# Patient Record
Sex: FEMALE | Race: WHITE | NOT HISPANIC OR LATINO | URBAN - METROPOLITAN AREA
[De-identification: names, ages, dates, MRNs, and addresses within clinical notes are randomized per-mention and may not be internally consistent; named-entity substitution may affect disease eponyms.]

---

## 2017-02-21 ENCOUNTER — OUTPATIENT (OUTPATIENT)
Dept: OUTPATIENT SERVICES | Facility: HOSPITAL | Age: 47
LOS: 1 days | Discharge: HOME | End: 2017-02-21

## 2017-06-27 DIAGNOSIS — R53.81 OTHER MALAISE: ICD-10-CM

## 2017-06-27 DIAGNOSIS — E11.9 TYPE 2 DIABETES MELLITUS WITHOUT COMPLICATIONS: ICD-10-CM

## 2017-06-27 DIAGNOSIS — M25.50 PAIN IN UNSPECIFIED JOINT: ICD-10-CM

## 2017-06-27 DIAGNOSIS — R06.02 SHORTNESS OF BREATH: ICD-10-CM

## 2017-06-27 DIAGNOSIS — Z00.00 ENCOUNTER FOR GENERAL ADULT MEDICAL EXAMINATION WITHOUT ABNORMAL FINDINGS: ICD-10-CM

## 2017-06-27 DIAGNOSIS — E78.00 PURE HYPERCHOLESTEROLEMIA, UNSPECIFIED: ICD-10-CM

## 2017-06-27 DIAGNOSIS — R94.6 ABNORMAL RESULTS OF THYROID FUNCTION STUDIES: ICD-10-CM

## 2017-06-27 DIAGNOSIS — E55.9 VITAMIN D DEFICIENCY, UNSPECIFIED: ICD-10-CM

## 2017-06-27 DIAGNOSIS — R53.83 OTHER FATIGUE: ICD-10-CM

## 2017-06-27 DIAGNOSIS — E03.9 HYPOTHYROIDISM, UNSPECIFIED: ICD-10-CM

## 2017-06-27 DIAGNOSIS — N39.0 URINARY TRACT INFECTION, SITE NOT SPECIFIED: ICD-10-CM

## 2017-06-27 DIAGNOSIS — R74.8 ABNORMAL LEVELS OF OTHER SERUM ENZYMES: ICD-10-CM

## 2018-02-03 ENCOUNTER — OUTPATIENT (OUTPATIENT)
Dept: OUTPATIENT SERVICES | Facility: HOSPITAL | Age: 48
LOS: 1 days | Discharge: HOME | End: 2018-02-03

## 2018-02-03 DIAGNOSIS — Z12.31 ENCOUNTER FOR SCREENING MAMMOGRAM FOR MALIGNANT NEOPLASM OF BREAST: ICD-10-CM

## 2018-08-08 ENCOUNTER — TRANSCRIPTION ENCOUNTER (OUTPATIENT)
Age: 48
End: 2018-08-08

## 2018-09-20 ENCOUNTER — TRANSCRIPTION ENCOUNTER (OUTPATIENT)
Age: 48
End: 2018-09-20

## 2018-10-16 ENCOUNTER — TRANSCRIPTION ENCOUNTER (OUTPATIENT)
Age: 48
End: 2018-10-16

## 2018-10-17 ENCOUNTER — TRANSCRIPTION ENCOUNTER (OUTPATIENT)
Age: 48
End: 2018-10-17

## 2019-02-09 ENCOUNTER — OUTPATIENT (OUTPATIENT)
Dept: OUTPATIENT SERVICES | Facility: HOSPITAL | Age: 49
LOS: 1 days | Discharge: HOME | End: 2019-02-09

## 2019-02-09 DIAGNOSIS — Z12.31 ENCOUNTER FOR SCREENING MAMMOGRAM FOR MALIGNANT NEOPLASM OF BREAST: ICD-10-CM

## 2019-06-25 ENCOUNTER — TRANSCRIPTION ENCOUNTER (OUTPATIENT)
Age: 49
End: 2019-06-25

## 2019-07-20 ENCOUNTER — TRANSCRIPTION ENCOUNTER (OUTPATIENT)
Age: 49
End: 2019-07-20

## 2019-07-31 ENCOUNTER — OUTPATIENT (OUTPATIENT)
Dept: OUTPATIENT SERVICES | Facility: HOSPITAL | Age: 49
LOS: 1 days | Discharge: HOME | End: 2019-07-31
Payer: COMMERCIAL

## 2019-07-31 DIAGNOSIS — R92.2 INCONCLUSIVE MAMMOGRAM: ICD-10-CM

## 2019-07-31 PROCEDURE — 76641 ULTRASOUND BREAST COMPLETE: CPT | Mod: 26,50

## 2019-10-18 ENCOUNTER — TRANSCRIPTION ENCOUNTER (OUTPATIENT)
Age: 49
End: 2019-10-18

## 2020-02-15 ENCOUNTER — OUTPATIENT (OUTPATIENT)
Dept: OUTPATIENT SERVICES | Facility: HOSPITAL | Age: 50
LOS: 1 days | Discharge: HOME | End: 2020-02-15
Payer: COMMERCIAL

## 2020-02-15 DIAGNOSIS — Z12.31 ENCOUNTER FOR SCREENING MAMMOGRAM FOR MALIGNANT NEOPLASM OF BREAST: ICD-10-CM

## 2020-02-15 PROCEDURE — 77067 SCR MAMMO BI INCL CAD: CPT | Mod: 26

## 2020-02-15 PROCEDURE — 77063 BREAST TOMOSYNTHESIS BI: CPT | Mod: 26

## 2020-05-29 PROBLEM — Z00.00 ENCOUNTER FOR PREVENTIVE HEALTH EXAMINATION: Status: ACTIVE | Noted: 2020-05-29

## 2020-06-01 ENCOUNTER — APPOINTMENT (OUTPATIENT)
Dept: OTOLARYNGOLOGY | Facility: HOSPITAL | Age: 50
End: 2020-06-01
Payer: COMMERCIAL

## 2020-06-01 VITALS
WEIGHT: 150 LBS | SYSTOLIC BLOOD PRESSURE: 114 MMHG | DIASTOLIC BLOOD PRESSURE: 78 MMHG | HEIGHT: 64 IN | BODY MASS INDEX: 25.61 KG/M2

## 2020-06-01 DIAGNOSIS — Z78.9 OTHER SPECIFIED HEALTH STATUS: ICD-10-CM

## 2020-06-01 PROCEDURE — 99204 OFFICE O/P NEW MOD 45 MIN: CPT

## 2020-06-01 RX ORDER — DICYCLOMINE HYDROCHLORIDE 20.6 MG/1
TABLET ORAL
Refills: 0 | Status: ACTIVE | COMMUNITY

## 2020-06-01 NOTE — HISTORY OF PRESENT ILLNESS
[de-identified] : Patient presents today with c/o a ranula. Patient states it has been present for a few months. Patient notes it comes and goes. Patient has had a few procedures on it but it keeps getting infected and painful. She has previously seen Dr. Reyes for treatment but continues to have reoccurrence. \par She also has a h/o narrow ear canals and cerumen impaction. Her ears feel clogged. \par Pt has thyroid nodules and has been followed and has stable small nodules that have benign in the past.

## 2020-06-01 NOTE — PHYSICAL EXAM
[Normal] : lingual tonsils are normal [Midline] : trachea located in midline position [de-identified] : bilateral cerumen impaction, narrow eacs [de-identified] : left floor of mouth

## 2020-06-30 ENCOUNTER — APPOINTMENT (OUTPATIENT)
Dept: OTOLARYNGOLOGY | Facility: CLINIC | Age: 50
End: 2020-06-30
Payer: COMMERCIAL

## 2020-06-30 ENCOUNTER — TRANSCRIPTION ENCOUNTER (OUTPATIENT)
Age: 50
End: 2020-06-30

## 2020-06-30 PROCEDURE — 99214 OFFICE O/P EST MOD 30 MIN: CPT

## 2020-06-30 NOTE — HISTORY OF PRESENT ILLNESS
[de-identified] : Patient presents today with c/o a ranula. Patient states it has been present for a few months. Patient notes it comes and goes. Patient has had a few procedures on it but it keeps getting infected and painful. She has previously seen Dr. Reyes for treatment but continues to have reoccurrence. \par She also has a h/o narrow ear canals and cerumen impaction. Her ears feel clogged. \par Pt has thyroid nodules and has been followed and has stable small nodules that have benign in the past.  [FreeTextEntry1] : \par 6/30/2020: Patient following up today on ranula and multiple thyroid nodules. Patient sent for MRI; here to discuss results. Patient denies any new changes. \par \par Regarding ranula she noted a cyst in the mouth, underwent procedure by Dr Reyes, drainage, though it has continued to recur. She was scheduled for surgery by Dr Feng at [facility] prior to COVID, was cancelled due to COVID. She notes continued swelling of the cyst, 2 days ago most recently, she then can feel it bursting. Denies pain currently. \par \par Regarding thyroid nodules, she has had them biopsied, most recently 2 years ago. was told they were negative for malignancy, most recent us 6 months, no change per patient, this all being monitored by Dr Reyes.

## 2020-06-30 NOTE — PHYSICAL EXAM
[Midline] : trachea located in midline position [Normal] : no rashes [de-identified] : mild fullness of the floor of mouth, midline and left > right

## 2020-06-30 NOTE — ASSESSMENT
[FreeTextEntry1] : - thyroid nodules stable and benign per patient, being followed by Dr Reyes for these\par - regarding ranula, not appreciable on MRI, images reviewed by me, notable dental artifact\par - warren request outside CT neck for pre-op review\par - discussed surgical excision of ranula, transoral, in management to prevent recurrence of cystic enlargement and bursting, which is bothering patient. Discussed risks, benefits, and alternatives in extensive detail. Discussed transoral excision with possible transcervical approach though suspect transoral approach will be adequate. Will review CT neck. Risks include scar, infection, recurrence, need for future procedures. Patient asked questions and these were answered to her apparent satisfaction. Patient gave informed written consent.\par

## 2020-06-30 NOTE — CONSULT LETTER
[Dear  ___] : Dear  [unfilled], [Please see my note below.] : Please see my note below. [Consult Letter:] : I had the pleasure of evaluating your patient, [unfilled]. [Sincerely,] : Sincerely, [Consult Closing:] : Thank you very much for allowing me to participate in the care of this patient.  If you have any questions, please do not hesitate to contact me. [FreeTextEntry2] : Kyle Kyle MD [FreeTextEntry3] : Tequila Liz MD\par Otolaryngology - Head & Neck Surgery\par

## 2020-06-30 NOTE — DATA REVIEWED
[de-identified] : relevant images and reports personally reviewed by me:\par MRI 6/10/20\par no definite evidence of fluid collection/ranula ID'ed in left SMG region or anterior FOM corresponding to fusiform focus of low density on prior CT neck 1/27/20, though area obscured by dental amalgam artifact

## 2020-06-30 NOTE — REASON FOR VISIT
[Subsequent Evaluation] : a subsequent evaluation for [FreeTextEntry2] : ranula of floor of mouth, thyroid nodules

## 2020-07-24 ENCOUNTER — OUTPATIENT (OUTPATIENT)
Dept: OUTPATIENT SERVICES | Facility: HOSPITAL | Age: 50
LOS: 1 days | Discharge: HOME | End: 2020-07-24
Payer: COMMERCIAL

## 2020-07-24 VITALS
WEIGHT: 156.53 LBS | HEIGHT: 64 IN | RESPIRATION RATE: 16 BRPM | SYSTOLIC BLOOD PRESSURE: 108 MMHG | OXYGEN SATURATION: 99 % | TEMPERATURE: 98 F | HEART RATE: 76 BPM | DIASTOLIC BLOOD PRESSURE: 66 MMHG

## 2020-07-24 DIAGNOSIS — Z98.82 BREAST IMPLANT STATUS: Chronic | ICD-10-CM

## 2020-07-24 DIAGNOSIS — Z01.818 ENCOUNTER FOR OTHER PREPROCEDURAL EXAMINATION: ICD-10-CM

## 2020-07-24 DIAGNOSIS — E04.2 NONTOXIC MULTINODULAR GOITER: ICD-10-CM

## 2020-07-24 DIAGNOSIS — Z98.890 OTHER SPECIFIED POSTPROCEDURAL STATES: Chronic | ICD-10-CM

## 2020-07-24 LAB
ALBUMIN SERPL ELPH-MCNC: 4.2 G/DL — SIGNIFICANT CHANGE UP (ref 3.5–5.2)
ALP SERPL-CCNC: 43 U/L — SIGNIFICANT CHANGE UP (ref 30–115)
ALT FLD-CCNC: 21 U/L — SIGNIFICANT CHANGE UP (ref 0–41)
ANION GAP SERPL CALC-SCNC: 10 MMOL/L — SIGNIFICANT CHANGE UP (ref 7–14)
APTT BLD: 33.7 SEC — SIGNIFICANT CHANGE UP (ref 27–39.2)
AST SERPL-CCNC: 21 U/L — SIGNIFICANT CHANGE UP (ref 0–41)
BASOPHILS # BLD AUTO: 0.02 K/UL — SIGNIFICANT CHANGE UP (ref 0–0.2)
BASOPHILS NFR BLD AUTO: 0.5 % — SIGNIFICANT CHANGE UP (ref 0–1)
BILIRUB SERPL-MCNC: 0.3 MG/DL — SIGNIFICANT CHANGE UP (ref 0.2–1.2)
BUN SERPL-MCNC: 16 MG/DL — SIGNIFICANT CHANGE UP (ref 10–20)
CALCIUM SERPL-MCNC: 9.7 MG/DL — SIGNIFICANT CHANGE UP (ref 8.5–10.1)
CHLORIDE SERPL-SCNC: 102 MMOL/L — SIGNIFICANT CHANGE UP (ref 98–110)
CO2 SERPL-SCNC: 27 MMOL/L — SIGNIFICANT CHANGE UP (ref 17–32)
CREAT SERPL-MCNC: 0.7 MG/DL — SIGNIFICANT CHANGE UP (ref 0.7–1.5)
EOSINOPHIL # BLD AUTO: 0.07 K/UL — SIGNIFICANT CHANGE UP (ref 0–0.7)
EOSINOPHIL NFR BLD AUTO: 1.8 % — SIGNIFICANT CHANGE UP (ref 0–8)
GLUCOSE SERPL-MCNC: 78 MG/DL — SIGNIFICANT CHANGE UP (ref 70–99)
HCT VFR BLD CALC: 40.1 % — SIGNIFICANT CHANGE UP (ref 37–47)
HGB BLD-MCNC: 12.9 G/DL — SIGNIFICANT CHANGE UP (ref 12–16)
IMM GRANULOCYTES NFR BLD AUTO: 0.3 % — SIGNIFICANT CHANGE UP (ref 0.1–0.3)
INR BLD: 1.1 RATIO — SIGNIFICANT CHANGE UP (ref 0.65–1.3)
LYMPHOCYTES # BLD AUTO: 1.47 K/UL — SIGNIFICANT CHANGE UP (ref 1.2–3.4)
LYMPHOCYTES # BLD AUTO: 38 % — SIGNIFICANT CHANGE UP (ref 20.5–51.1)
MCHC RBC-ENTMCNC: 32 PG — HIGH (ref 27–31)
MCHC RBC-ENTMCNC: 32.2 G/DL — SIGNIFICANT CHANGE UP (ref 32–37)
MCV RBC AUTO: 99.5 FL — HIGH (ref 81–99)
MONOCYTES # BLD AUTO: 0.48 K/UL — SIGNIFICANT CHANGE UP (ref 0.1–0.6)
MONOCYTES NFR BLD AUTO: 12.4 % — HIGH (ref 1.7–9.3)
NEUTROPHILS # BLD AUTO: 1.82 K/UL — SIGNIFICANT CHANGE UP (ref 1.4–6.5)
NEUTROPHILS NFR BLD AUTO: 47 % — SIGNIFICANT CHANGE UP (ref 42.2–75.2)
NRBC # BLD: 0 /100 WBCS — SIGNIFICANT CHANGE UP (ref 0–0)
PLATELET # BLD AUTO: 244 K/UL — SIGNIFICANT CHANGE UP (ref 130–400)
POTASSIUM SERPL-MCNC: 4.7 MMOL/L — SIGNIFICANT CHANGE UP (ref 3.5–5)
POTASSIUM SERPL-SCNC: 4.7 MMOL/L — SIGNIFICANT CHANGE UP (ref 3.5–5)
PROT SERPL-MCNC: 6.9 G/DL — SIGNIFICANT CHANGE UP (ref 6–8)
PROTHROM AB SERPL-ACNC: 12.6 SEC — SIGNIFICANT CHANGE UP (ref 9.95–12.87)
RBC # BLD: 4.03 M/UL — LOW (ref 4.2–5.4)
RBC # FLD: 13.4 % — SIGNIFICANT CHANGE UP (ref 11.5–14.5)
SODIUM SERPL-SCNC: 139 MMOL/L — SIGNIFICANT CHANGE UP (ref 135–146)
WBC # BLD: 3.87 K/UL — LOW (ref 4.8–10.8)
WBC # FLD AUTO: 3.87 K/UL — LOW (ref 4.8–10.8)

## 2020-07-24 PROCEDURE — 93010 ELECTROCARDIOGRAM REPORT: CPT

## 2020-07-24 NOTE — H&P PST ADULT - NSICDXPASTMEDICALHX_GEN_ALL_CORE_FT
PAST MEDICAL HISTORY:  Anxiety     Has immunity to COVID-19 virus     IBS (irritable bowel syndrome)

## 2020-07-24 NOTE — H&P PST ADULT - HISTORY OF PRESENT ILLNESS
COVID-19 exposure/symptoms:     FOS:    Mallampati:    Reactions to anesthesia:    CPAP/sleep apnea: COVID-19 exposure/symptoms: PT has antibodies; mild fever in April; no lung symptoms at the time; lungs sound clear today; no MARTIN     FOS: 3    Mallampati: 3    Reactions to anesthesia: no    CPAP/sleep apnea: no

## 2020-07-24 NOTE — H&P PST ADULT - REASON FOR ADMISSION
49 Y.O. F scheduled for floor of mouth ranula excision transoral possible transcervical approach with Dr. Liz on 07/31/20

## 2020-07-28 ENCOUNTER — LABORATORY RESULT (OUTPATIENT)
Age: 50
End: 2020-07-28

## 2020-07-28 ENCOUNTER — OUTPATIENT (OUTPATIENT)
Dept: OUTPATIENT SERVICES | Facility: HOSPITAL | Age: 50
LOS: 1 days | Discharge: HOME | End: 2020-07-28

## 2020-07-28 DIAGNOSIS — Z11.59 ENCOUNTER FOR SCREENING FOR OTHER VIRAL DISEASES: ICD-10-CM

## 2020-07-28 DIAGNOSIS — Z98.890 OTHER SPECIFIED POSTPROCEDURAL STATES: Chronic | ICD-10-CM

## 2020-07-28 DIAGNOSIS — Z98.82 BREAST IMPLANT STATUS: Chronic | ICD-10-CM

## 2020-07-28 PROBLEM — K58.9 IRRITABLE BOWEL SYNDROME, UNSPECIFIED: Chronic | Status: ACTIVE | Noted: 2020-07-24

## 2020-07-28 PROBLEM — F41.9 ANXIETY DISORDER, UNSPECIFIED: Chronic | Status: ACTIVE | Noted: 2020-07-24

## 2020-07-28 PROBLEM — Z78.9 OTHER SPECIFIED HEALTH STATUS: Chronic | Status: ACTIVE | Noted: 2020-07-24

## 2020-07-28 PROBLEM — K58.9 IRRITABLE BOWEL SYNDROME WITHOUT DIARRHEA: Chronic | Status: ACTIVE | Noted: 2020-07-24

## 2020-07-31 ENCOUNTER — RESULT REVIEW (OUTPATIENT)
Age: 50
End: 2020-07-31

## 2020-07-31 ENCOUNTER — APPOINTMENT (OUTPATIENT)
Dept: OTOLARYNGOLOGY | Facility: HOSPITAL | Age: 50
End: 2020-07-31

## 2020-07-31 ENCOUNTER — OUTPATIENT (OUTPATIENT)
Dept: OUTPATIENT SERVICES | Facility: HOSPITAL | Age: 50
LOS: 1 days | Discharge: HOME | End: 2020-07-31
Payer: COMMERCIAL

## 2020-07-31 VITALS
DIASTOLIC BLOOD PRESSURE: 82 MMHG | HEIGHT: 64 IN | RESPIRATION RATE: 18 BRPM | OXYGEN SATURATION: 100 % | SYSTOLIC BLOOD PRESSURE: 120 MMHG | WEIGHT: 149.91 LBS | HEART RATE: 65 BPM | TEMPERATURE: 98 F

## 2020-07-31 VITALS
SYSTOLIC BLOOD PRESSURE: 110 MMHG | RESPIRATION RATE: 21 BRPM | DIASTOLIC BLOOD PRESSURE: 64 MMHG | OXYGEN SATURATION: 99 % | HEART RATE: 74 BPM

## 2020-07-31 DIAGNOSIS — K11.6 MUCOCELE OF SALIVARY GLAND: ICD-10-CM

## 2020-07-31 DIAGNOSIS — Z98.82 BREAST IMPLANT STATUS: Chronic | ICD-10-CM

## 2020-07-31 DIAGNOSIS — Z98.890 OTHER SPECIFIED POSTPROCEDURAL STATES: Chronic | ICD-10-CM

## 2020-07-31 PROCEDURE — 42450 EXCISE SUBLINGUAL GLAND: CPT

## 2020-07-31 PROCEDURE — 88305 TISSUE EXAM BY PATHOLOGIST: CPT | Mod: 26

## 2020-07-31 PROCEDURE — 42408 EXCISION OF SALIVARY CYST: CPT

## 2020-07-31 RX ORDER — SODIUM CHLORIDE 9 MG/ML
1000 INJECTION, SOLUTION INTRAVENOUS
Refills: 0 | Status: DISCONTINUED | OUTPATIENT
Start: 2020-07-31 | End: 2020-08-15

## 2020-07-31 RX ORDER — DEXAMETHASONE 0.5 MG/5ML
2 ELIXIR ORAL
Qty: 4 | Refills: 0
Start: 2020-07-31 | End: 2020-08-01

## 2020-07-31 RX ORDER — CHLORHEXIDINE GLUCONATE 213 G/1000ML
15 SOLUTION TOPICAL
Qty: 240 | Refills: 0
Start: 2020-07-31 | End: 2020-08-06

## 2020-07-31 RX ORDER — HYDROMORPHONE HYDROCHLORIDE 2 MG/ML
0.5 INJECTION INTRAMUSCULAR; INTRAVENOUS; SUBCUTANEOUS
Refills: 0 | Status: DISCONTINUED | OUTPATIENT
Start: 2020-07-31 | End: 2020-07-31

## 2020-07-31 RX ORDER — ONDANSETRON 8 MG/1
4 TABLET, FILM COATED ORAL ONCE
Refills: 0 | Status: COMPLETED | OUTPATIENT
Start: 2020-07-31 | End: 2020-07-31

## 2020-07-31 RX ORDER — OXYCODONE AND ACETAMINOPHEN 5; 325 MG/1; MG/1
1 TABLET ORAL ONCE
Refills: 0 | Status: DISCONTINUED | OUTPATIENT
Start: 2020-07-31 | End: 2020-07-31

## 2020-07-31 RX ORDER — TRAMADOL HYDROCHLORIDE 50 MG/1
1 TABLET ORAL
Qty: 12 | Refills: 0
Start: 2020-07-31 | End: 2020-08-02

## 2020-07-31 RX ORDER — ACETAMINOPHEN 500 MG
1000 TABLET ORAL ONCE
Refills: 0 | Status: COMPLETED | OUTPATIENT
Start: 2020-07-31 | End: 2020-07-31

## 2020-07-31 RX ORDER — HYDROMORPHONE HYDROCHLORIDE 2 MG/ML
1 INJECTION INTRAMUSCULAR; INTRAVENOUS; SUBCUTANEOUS
Refills: 0 | Status: DISCONTINUED | OUTPATIENT
Start: 2020-07-31 | End: 2020-07-31

## 2020-07-31 RX ADMIN — Medication 1000 MILLIGRAM(S): at 16:15

## 2020-07-31 RX ADMIN — Medication 400 MILLIGRAM(S): at 15:16

## 2020-07-31 RX ADMIN — ONDANSETRON 4 MILLIGRAM(S): 8 TABLET, FILM COATED ORAL at 14:20

## 2020-07-31 NOTE — BRIEF OPERATIVE NOTE - NSICDXBRIEFPROCEDURE_GEN_ALL_CORE_FT
PROCEDURES:  Excision of left sublingual gland 31-Jul-2020 14:11:56  Wiliam Neal  Excision of ranula of sublingual gland 31-Jul-2020 14:11:40  Wiliam Neal
- - -

## 2020-07-31 NOTE — ASU DISCHARGE PLAN (ADULT/PEDIATRIC) - SPECIFY DIET AND FLUID
Please avoid brittle, sharp, or hard food items for the first few days as you have absorbable sutures in your mouth from your procedure. Start with liquids and soft foods and advance slowly to regular solids as tolerated.

## 2020-07-31 NOTE — ASU DISCHARGE PLAN (ADULT/PEDIATRIC) - CARE PROVIDER_API CALL
Tequila Liz  OTOLARYNGOLOGY  [address] [place] [id number]  Phone: [phone]  Fax: [phone]  Established Patient  Follow Up Time: 1 week

## 2020-07-31 NOTE — BRIEF OPERATIVE NOTE - OPERATION/FINDINGS
Left simple ranula excised with sublingual gland  Duct of Left submandibular gland preserved and confirmed intact with lacrimal probe

## 2020-07-31 NOTE — ASU PATIENT PROFILE, ADULT - VISION (WITH CORRECTIVE LENSES IF THE PATIENT USUALLY WEARS THEM):
Normal vision: sees adequately in most situations; can see medication labels, newsprint/wears glasses-left them at home

## 2020-07-31 NOTE — ASU DISCHARGE PLAN (ADULT/PEDIATRIC) - ACTIVITY LEVEL
No excercise/Please avoid strenuous physical activity involving the head, neck, or mouth for 2-3 weeks, please keep head of bed elevated and avoid placing anything other than food or liquids in your mouth during this time to avoid injuring your sutures/No heavy lifting

## 2020-08-04 RX ORDER — PROCHLORPERAZINE MALEATE 5 MG/1
5 TABLET ORAL
Qty: 18 | Refills: 0 | Status: ACTIVE | COMMUNITY
Start: 2020-08-04 | End: 1900-01-01

## 2020-08-06 LAB — SURGICAL PATHOLOGY STUDY: SIGNIFICANT CHANGE UP

## 2020-08-07 ENCOUNTER — APPOINTMENT (OUTPATIENT)
Dept: OTOLARYNGOLOGY | Facility: CLINIC | Age: 50
End: 2020-08-07
Payer: COMMERCIAL

## 2020-08-07 PROCEDURE — 99024 POSTOP FOLLOW-UP VISIT: CPT

## 2020-08-07 NOTE — HISTORY OF PRESENT ILLNESS
[FreeTextEntry1] : \par 8/7/2020: Patient here s/p ranula excision, possible transcervical approach 7/31/2020. Some pain; worse in the morning. She admits she is grinding her teeth at night. Pain 5/10.  Is using peridex BID.  [de-identified] : Patient presents today with c/o a ranula. Patient states it has been present for a few months. Patient notes it comes and goes. Patient has had a few procedures on it but it keeps getting infected and painful. She has previously seen Dr. Reyes for treatment but continues to have reoccurrence. \par She also has a h/o narrow ear canals and cerumen impaction. Her ears feel clogged. \par Pt has thyroid nodules and has been followed and has stable small nodules that have benign in the past. \par \par \par 6/30/2020: Patient following up today on ranula and multiple thyroid nodules. Patient sent for MRI; here to discuss results. Patient denies any new changes. \par \par Regarding ranula she noted a cyst in the mouth, underwent procedure by Dr Reyes, drainage, though it has continued to recur. She was scheduled for surgery by Dr Feng at [facility] prior to COVID, was cancelled due to COVID. She notes continued swelling of the cyst, 2 days ago most recently, she then can feel it bursting. Denies pain currently. \par \par Regarding thyroid nodules, she has had them biopsied, most recently 2 years ago. was told they were negative for malignancy, most recent us 6 months, no change per patient, this all being monitored by Dr Reyes.

## 2020-08-07 NOTE — CONSULT LETTER
[Dear  ___] : Dear  [unfilled], [Consult Letter:] : I had the pleasure of evaluating your patient, [unfilled]. [Please see my note below.] : Please see my note below. [Consult Closing:] : Thank you very much for allowing me to participate in the care of this patient.  If you have any questions, please do not hesitate to contact me. [FreeTextEntry2] : Kyle Kyle MD [Sincerely,] : Sincerely, [FreeTextEntry3] : Tequila Liz MD\par Otolaryngology - Head & Neck Surgery\par

## 2020-08-07 NOTE — ASSESSMENT
[FreeTextEntry1] : - healing well s/p left ranula and sublingual gland excision\par - f/up in 2 weeks

## 2020-08-07 NOTE — PHYSICAL EXAM
[Normal] : normal appearance, well groomed, well nourished, and in no acute distress [de-identified] : left floor of mouth incision healing well, soft and flat, minimal tethering of tongue to FOM. No left SMG swelling

## 2020-08-07 NOTE — DATA REVIEWED
[de-identified] : relevant images and reports personally reviewed by me:\par SUKHJINDER OSEGUERA                      1\par \par \par \par Surgical Final Report\par \par \par \par \par Final Diagnosis\par Left floor of mouth ranula and left sublingual gland, excision:\par - Sublingual gland and squamous lined mucosa showing a cystic\par cavity devoid of epithelial lining with histiocytic reaction;\par features consistent with ranula.\par - No evidence of malignancy seen.\par \par Verified by: Dinora Hook M.D.\par (Electronic Signature)\par Reported on: 08/06/20 15:57 EDT, [facility] [address]\par Phone: [phone]   Fax: [phone]

## 2020-08-31 ENCOUNTER — APPOINTMENT (OUTPATIENT)
Dept: OTOLARYNGOLOGY | Facility: CLINIC | Age: 50
End: 2020-08-31
Payer: COMMERCIAL

## 2020-08-31 PROCEDURE — 99024 POSTOP FOLLOW-UP VISIT: CPT

## 2020-08-31 PROCEDURE — 69210 REMOVE IMPACTED EAR WAX UNI: CPT | Mod: 79

## 2020-08-31 NOTE — PHYSICAL EXAM
[Normal] : no rashes [de-identified] : impacted cerumen [de-identified] : Oral mucosa: left floor of mouth incision well healed, soft and flat, minimal tethering of tongue to FOM. No left SMG swelling.

## 2020-08-31 NOTE — ASSESSMENT
[FreeTextEntry1] : - cerumen removed today\par - will obtain thyroid ultrasound, will call with results, [phone], will biopsy as needed per ultrasound\par - f/up in 6 months

## 2020-08-31 NOTE — HISTORY OF PRESENT ILLNESS
[de-identified] : Patient presents today with c/o a ranula. Patient states it has been present for a few months. Patient notes it comes and goes. Patient has had a few procedures on it but it keeps getting infected and painful. She has previously seen Dr. Reyes for treatment but continues to have reoccurrence. \par She also has a h/o narrow ear canals and cerumen impaction. Her ears feel clogged. \par Pt has thyroid nodules and has been followed and has stable small nodules that have benign in the past. \par \par \par 6/30/2020: Patient following up today on ranula and multiple thyroid nodules. Patient sent for MRI; here to discuss results. Patient denies any new changes. \par \par Regarding ranula she noted a cyst in the mouth, underwent procedure by Dr Reyes, drainage, though it has continued to recur. She was scheduled for surgery by Dr Feng at [facility] prior to COVID, was cancelled due to COVID. She notes continued swelling of the cyst, 2 days ago most recently, she then can feel it bursting. Denies pain currently. \par \par Regarding thyroid nodules, she has had them biopsied, most recently 2 years ago. was told they were negative for malignancy, most recent us 6 months, no change per patient, this all being monitored by Dr Reyes.\par \par 8/7/2020: Patient here s/p ranula excision, possible transcervical approach 7/31/2020. Some pain; worse in the morning. She admits she is grinding her teeth at night. Pain 5/10.  Is using peridex BID.  [FreeTextEntry1] : \par 8/31/2020: Patient presents today following up on ranula excision 7/31/2020. Patient doing well. No pain. Numbness.\par Patient also c/o clogged ears. No otalgia. Clogged sensation. She admits h/o wax build up and clogged ears. \par \par regarding her thyroid nodules, she reports she is due for another ultrasound, previous one was over 1 year ago, has had 3 previous FNAs.

## 2020-08-31 NOTE — CONSULT LETTER
[Dear  ___] : Dear  [unfilled], [Please see my note below.] : Please see my note below. [Consult Letter:] : I had the pleasure of evaluating your patient, [unfilled]. [Consult Closing:] : Thank you very much for allowing me to participate in the care of this patient.  If you have any questions, please do not hesitate to contact me. [Sincerely,] : Sincerely, [FreeTextEntry2] : Dr. Andrez Foster [FreeTextEntry3] : Tequila Liz MD\par Otolaryngology - Head & Neck Surgery\par

## 2020-09-25 ENCOUNTER — OUTPATIENT (OUTPATIENT)
Dept: OUTPATIENT SERVICES | Facility: HOSPITAL | Age: 50
LOS: 1 days | Discharge: HOME | End: 2020-09-25
Payer: COMMERCIAL

## 2020-09-25 DIAGNOSIS — Z98.890 OTHER SPECIFIED POSTPROCEDURAL STATES: Chronic | ICD-10-CM

## 2020-09-25 DIAGNOSIS — Z98.82 BREAST IMPLANT STATUS: Chronic | ICD-10-CM

## 2020-09-25 DIAGNOSIS — R92.8 OTHER ABNORMAL AND INCONCLUSIVE FINDINGS ON DIAGNOSTIC IMAGING OF BREAST: ICD-10-CM

## 2020-09-25 PROCEDURE — 77065 DX MAMMO INCL CAD UNI: CPT | Mod: 26,RT

## 2020-09-25 PROCEDURE — 76642 ULTRASOUND BREAST LIMITED: CPT | Mod: 26,RT

## 2020-09-25 PROCEDURE — G0279: CPT | Mod: 26

## 2020-09-26 ENCOUNTER — OUTPATIENT (OUTPATIENT)
Dept: OUTPATIENT SERVICES | Facility: HOSPITAL | Age: 50
LOS: 1 days | Discharge: HOME | End: 2020-09-26
Payer: COMMERCIAL

## 2020-09-26 DIAGNOSIS — Z98.82 BREAST IMPLANT STATUS: Chronic | ICD-10-CM

## 2020-09-26 DIAGNOSIS — Z98.890 OTHER SPECIFIED POSTPROCEDURAL STATES: Chronic | ICD-10-CM

## 2020-09-26 DIAGNOSIS — E04.2 NONTOXIC MULTINODULAR GOITER: ICD-10-CM

## 2020-09-26 PROCEDURE — 76536 US EXAM OF HEAD AND NECK: CPT | Mod: 26

## 2020-10-27 ENCOUNTER — LABORATORY RESULT (OUTPATIENT)
Age: 50
End: 2020-10-27

## 2020-10-27 ENCOUNTER — OUTPATIENT (OUTPATIENT)
Dept: OUTPATIENT SERVICES | Facility: HOSPITAL | Age: 50
LOS: 1 days | Discharge: HOME | End: 2020-10-27

## 2020-10-27 DIAGNOSIS — Z98.82 BREAST IMPLANT STATUS: Chronic | ICD-10-CM

## 2020-10-27 DIAGNOSIS — Z98.890 OTHER SPECIFIED POSTPROCEDURAL STATES: Chronic | ICD-10-CM

## 2020-10-27 DIAGNOSIS — Z11.59 ENCOUNTER FOR SCREENING FOR OTHER VIRAL DISEASES: ICD-10-CM

## 2020-10-30 ENCOUNTER — RESULT REVIEW (OUTPATIENT)
Age: 50
End: 2020-10-30

## 2020-10-30 ENCOUNTER — OUTPATIENT (OUTPATIENT)
Dept: OUTPATIENT SERVICES | Facility: HOSPITAL | Age: 50
LOS: 1 days | Discharge: HOME | End: 2020-10-30
Payer: COMMERCIAL

## 2020-10-30 DIAGNOSIS — Z98.82 BREAST IMPLANT STATUS: Chronic | ICD-10-CM

## 2020-10-30 DIAGNOSIS — Z98.890 OTHER SPECIFIED POSTPROCEDURAL STATES: Chronic | ICD-10-CM

## 2020-10-30 PROCEDURE — 10005 FNA BX W/US GDN 1ST LES: CPT

## 2020-10-30 PROCEDURE — 88172 CYTP DX EVAL FNA 1ST EA SITE: CPT | Mod: 26

## 2020-10-30 PROCEDURE — 88305 TISSUE EXAM BY PATHOLOGIST: CPT | Mod: 26

## 2020-10-30 PROCEDURE — 88173 CYTOPATH EVAL FNA REPORT: CPT | Mod: 26

## 2020-10-30 NOTE — PROGRESS NOTE ADULT - SUBJECTIVE AND OBJECTIVE BOX
Interventional Radiology Outpatient Documentation    PREOPERATIVE DAY OF PROCEDURE EVALUATION:     I have personally seen and examined this patient. I agree with the history and physical which I have reviewed and noted any changes below:     Plan is for right thyroid FNA  10-30-20 @ 09:40    Procedure/ risks/ benefits/ goals/ alternatives were explained. All questions answered. Informed content obtained from patient. Consent placed in chart.

## 2020-11-03 LAB — NON-GYNECOLOGICAL CYTOLOGY STUDY: SIGNIFICANT CHANGE UP

## 2020-11-05 DIAGNOSIS — E04.1 NONTOXIC SINGLE THYROID NODULE: ICD-10-CM

## 2020-12-22 ENCOUNTER — APPOINTMENT (OUTPATIENT)
Dept: OTOLARYNGOLOGY | Facility: CLINIC | Age: 50
End: 2020-12-22
Payer: COMMERCIAL

## 2020-12-22 DIAGNOSIS — R51.9 HEADACHE, UNSPECIFIED: ICD-10-CM

## 2020-12-22 PROCEDURE — 69210 REMOVE IMPACTED EAR WAX UNI: CPT

## 2020-12-22 PROCEDURE — 99024 POSTOP FOLLOW-UP VISIT: CPT

## 2020-12-22 PROCEDURE — 31575 DIAGNOSTIC LARYNGOSCOPY: CPT

## 2020-12-22 RX ORDER — LORAZEPAM 0.5 MG/1
0.5 TABLET ORAL
Qty: 3 | Refills: 0 | Status: ACTIVE | COMMUNITY
Start: 2020-12-22 | End: 1900-01-01

## 2020-12-22 NOTE — CONSULT LETTER
[Dear  ___] : Dear  [unfilled], [Consult Letter:] : I had the pleasure of evaluating your patient, [unfilled]. [Please see my note below.] : Please see my note below. [Consult Closing:] : Thank you very much for allowing me to participate in the care of this patient.  If you have any questions, please do not hesitate to contact me. [Sincerely,] : Sincerely, [FreeTextEntry2] : Dr. Andrez Foster  [FreeTextEntry3] : Tequila Liz MD\par Otolaryngology - Head & Neck Surgery\par

## 2020-12-22 NOTE — DATA REVIEWED
[de-identified] : relevant images and reports personally reviewed by me:\par Celia Accession Number : [id number]\par \par SUKHJINDER OSEGUERA                      2\par \par \par \par Cytopathology Report\par \par \par \par \par \par Final Diagnosis\par THYROID NODULE, RIGHT, US-GUIDED FNA\par BENIGN FINDINGS (Category II).\par Adenomatous nodule. (See note)\par \par Note: Cytology slides and cell blocks show bland follicular cells\par in cohesive groups, in a background of macrophages and watery\par colloid.\par \par The [place] System for Reporting Thyroid Cytopathology: Implied\par Risk of Malignancy and Recommended Clinical Management**\par \par Diagnostic Category Risk of Malignancy (%) Usual Management*\par \par I. Non Diagnostic 5-10 Repeat FNA with US guidance\par \par II. Benign 0-3 Clinical follow up\par \par III. Atypia of Undetermined Significance or Follicular Lesion of\par Undetermined Significance\par 10-30 Repeat FNA , molecular testing, or\par lobectomy\par IV. Follicular neoplasm  or\par Suspicious for Follicular Neoplasm 25-40 Molecular testing,\par lobectomy\par V. Suspicious for Malignancy 50-75 Near-total thyroidectomy or\par lobectomy\par \par VI. Malignant 97-99 Near-total thyroidectomy or lobectomy\par \par *Actual management may depend on other factors (e.g. clinical,\par sonographic) besides the FNA interpretation.\par **Adapted from David SDigna EMMANUEL., and Shaw[place] E. S. (2018). The [place]\par System for Reporting Thyroid Cytopathology:\par Definitions, criteria, and explanatory notes. Wilfrid: Kari\par \par Screened by: Audra WILSON(ASCP)\par Verified by: Rere Turk MD\par (Electronic Signature)\par Reported on: 11/03/20 11:11 EST, [facility], 3rd Floor,\[place] [address]\par Phone: [phone]   Fax: [phone]\par

## 2020-12-22 NOTE — HISTORY OF PRESENT ILLNESS
[de-identified] : Patient presents today with c/o a ranula. Patient states it has been present for a few months. Patient notes it comes and goes. Patient has had a few procedures on it but it keeps getting infected and painful. She has previously seen Dr. Reyes for treatment but continues to have reoccurrence. \par She also has a h/o narrow ear canals and cerumen impaction. Her ears feel clogged. \par Pt has thyroid nodules and has been followed and has stable small nodules that have benign in the past. \par \par \par 6/30/2020: Patient following up today on ranula and multiple thyroid nodules. Patient sent for MRI; here to discuss results. Patient denies any new changes. \par \par Regarding ranula she noted a cyst in the mouth, underwent procedure by Dr Reyes, drainage, though it has continued to recur. She was scheduled for surgery by Dr Feng at [facility] prior to COVID, was cancelled due to COVID. She notes continued swelling of the cyst, 2 days ago most recently, she then can feel it bursting. Denies pain currently. \par \par Regarding thyroid nodules, she has had them biopsied, most recently 2 years ago. was told they were negative for malignancy, most recent us 6 months, no change per patient, this all being monitored by Dr Ryees.\par \par 8/7/2020: Patient here s/p ranula excision, possible transcervical approach 7/31/2020. Some pain; worse in the morning. She admits she is grinding her teeth at night. Pain 5/10.  Is using peridex BID. \par \par \par 8/31/2020: Patient presents today following up on ranula excision 7/31/2020. Patient doing well. No pain. Numbness.\par Patient also c/o clogged ears. No otalgia. Clogged sensation. She admits h/o wax build up and clogged ears. \par \par regarding her thyroid nodules, she reports she is due for another ultrasound, previous one was over 1 year ago, has had 3 previous FNAs.  [FreeTextEntry1] : \par 12/22/2020: Patient presents today following up on ranula of floor of mouth. Patient c/o soreness around the area where the ranula was removed. \par Patient also c/o clogged ears. Excessive wax build up.\par Patient also c/o sinus infections. Headaches recently, nearly daily, for 1 week. She has been taking Tylenol Sinus, which helps, resolves after several hours. Pressure in her forehead and under the eyes.  She "may" be going through menopause. \par Patient had thyroid sonogram and FNA performed, [place].

## 2020-12-22 NOTE — PHYSICAL EXAM
[de-identified] : impacted cerumen [de-identified] : left floor of mouth incision well healed, soft and flat [Normal] : no rashes

## 2020-12-22 NOTE — ASSESSMENT
[FreeTextEntry1] : - well healed s/p ranula excision\par - cerumen removed\par - no evidence of sinonasal pathology to explain headaches\par - recommend eval by ophthalmologist, she wears glasses\par - will refer to neurology\par - MRI brain, anxiolytic prior rx'ed\par - f/up in 3 months

## 2020-12-31 ENCOUNTER — OUTPATIENT (OUTPATIENT)
Dept: OUTPATIENT SERVICES | Facility: HOSPITAL | Age: 50
LOS: 1 days | Discharge: HOME | End: 2020-12-31
Payer: COMMERCIAL

## 2020-12-31 ENCOUNTER — RESULT REVIEW (OUTPATIENT)
Age: 50
End: 2020-12-31

## 2020-12-31 DIAGNOSIS — R51.9 HEADACHE, UNSPECIFIED: ICD-10-CM

## 2020-12-31 DIAGNOSIS — Z98.890 OTHER SPECIFIED POSTPROCEDURAL STATES: Chronic | ICD-10-CM

## 2020-12-31 DIAGNOSIS — Z98.82 BREAST IMPLANT STATUS: Chronic | ICD-10-CM

## 2020-12-31 PROCEDURE — 70553 MRI BRAIN STEM W/O & W/DYE: CPT | Mod: 26

## 2021-01-06 ENCOUNTER — APPOINTMENT (OUTPATIENT)
Dept: NEUROSURGERY | Facility: CLINIC | Age: 51
End: 2021-01-06
Payer: COMMERCIAL

## 2021-01-06 VITALS — BODY MASS INDEX: 25.61 KG/M2 | HEIGHT: 64 IN | WEIGHT: 150 LBS

## 2021-01-06 PROCEDURE — 99072 ADDL SUPL MATRL&STAF TM PHE: CPT

## 2021-01-06 PROCEDURE — 99204 OFFICE O/P NEW MOD 45 MIN: CPT

## 2021-01-06 NOTE — REASON FOR VISIT
[New Patient Visit] : a new patient visit [Referred By: _________] : Patient was referred by NORMA [Spouse] : spouse

## 2021-01-07 RX ORDER — BUTALBITAL, ACETAMINOPHEN AND CAFFEINE 300; 50; 40 MG/1; MG/1; MG/1
50-300-40 CAPSULE ORAL EVERY 8 HOURS
Qty: 21 | Refills: 0 | Status: DISCONTINUED | COMMUNITY
Start: 2021-01-06 | End: 2021-01-07

## 2021-01-07 RX ORDER — BUTALBITAL, ACETAMINOPHEN AND CAFFEINE 325; 50; 40 MG/1; MG/1; MG/1
50-325-40 TABLET ORAL EVERY 8 HOURS
Qty: 21 | Refills: 0 | Status: ACTIVE | COMMUNITY
Start: 2021-01-07 | End: 1900-01-01

## 2021-01-07 NOTE — HISTORY OF PRESENT ILLNESS
[FreeTextEntry1] : headaches [de-identified] : This is a 50 yrs old female with a hx of vertigo (10 yrs ago s/p post concussion syndrome), sinus surgery ( 10 yrs ago) and chronic sinus infection who presents today reporting of frontal/occipital headaches before the Christmas holiday. Reports they occur spontaneously, can last hours, and resolves with either acetaminophen or Ibuprofen, but then headaches recurs. It is associated with photophobia, nausea, dizziness, fatigue. The headaches makes it hard for her to concentrate and at times she wakes up with the headaches. Denies visual disturbance, gait imbalance, paresthesia, vomiting, and phonophobia. Family hx of multiple sclerosis and migraine. Also she had Covid last April.\par \par MRI of the brain w/w/o contrast showed three enhancing lesions within the brainstem, the largest measuring 7 mm. No significant surrounding edema or mass effect

## 2021-01-07 NOTE — PLAN
[FreeTextEntry1] : At this time, I recommend LP.  Differential is demyelinating, postinfectious/inflammatory, vs neoplastic.  CSF will be sent for oligoclonal bands, cytology, flow cytometry, PCR panel.\par \par She will be referred to neurologist.

## 2021-01-18 ENCOUNTER — LABORATORY RESULT (OUTPATIENT)
Age: 51
End: 2021-01-18

## 2021-01-18 ENCOUNTER — OUTPATIENT (OUTPATIENT)
Dept: OUTPATIENT SERVICES | Facility: HOSPITAL | Age: 51
LOS: 1 days | Discharge: HOME | End: 2021-01-18

## 2021-01-18 DIAGNOSIS — Z11.59 ENCOUNTER FOR SCREENING FOR OTHER VIRAL DISEASES: ICD-10-CM

## 2021-01-18 DIAGNOSIS — Z98.890 OTHER SPECIFIED POSTPROCEDURAL STATES: Chronic | ICD-10-CM

## 2021-01-18 DIAGNOSIS — Z98.82 BREAST IMPLANT STATUS: Chronic | ICD-10-CM

## 2021-01-22 ENCOUNTER — RESULT REVIEW (OUTPATIENT)
Age: 51
End: 2021-01-22

## 2021-01-22 ENCOUNTER — OUTPATIENT (OUTPATIENT)
Dept: OUTPATIENT SERVICES | Facility: HOSPITAL | Age: 51
LOS: 1 days | Discharge: HOME | End: 2021-01-22
Payer: COMMERCIAL

## 2021-01-22 DIAGNOSIS — Z98.890 OTHER SPECIFIED POSTPROCEDURAL STATES: Chronic | ICD-10-CM

## 2021-01-22 DIAGNOSIS — Z98.82 BREAST IMPLANT STATUS: Chronic | ICD-10-CM

## 2021-01-22 LAB
APPEARANCE CSF: CLEAR — SIGNIFICANT CHANGE UP
COLOR CSF: SIGNIFICANT CHANGE UP
GLUCOSE CSF-MCNC: 59 MG/DL — SIGNIFICANT CHANGE UP (ref 45–75)
NEUTROPHILS # CSF: SIGNIFICANT CHANGE UP % (ref 0–6)
NRBC NFR CSF: 2 /UL — SIGNIFICANT CHANGE UP (ref 0–5)
PROT CSF-MCNC: 24 MG/DL — SIGNIFICANT CHANGE UP (ref 15–45)
RBC # CSF: 201 /UL — SIGNIFICANT CHANGE UP (ref 0–0)
TUBE TYPE: SIGNIFICANT CHANGE UP

## 2021-01-22 PROCEDURE — 62328 DX LMBR SPI PNXR W/FLUOR/CT: CPT

## 2021-01-22 PROCEDURE — 88108 CYTOPATH CONCENTRATE TECH: CPT | Mod: 26

## 2021-01-23 LAB
CRYPTOC AG CSF-ACNC: NEGATIVE — SIGNIFICANT CHANGE UP
CRYPTOC AG FLD QL: NEGATIVE — SIGNIFICANT CHANGE UP
GRAM STN FLD: SIGNIFICANT CHANGE UP
NIGHT BLUE STAIN TISS: SIGNIFICANT CHANGE UP
SPECIMEN SOURCE: SIGNIFICANT CHANGE UP
SPECIMEN SOURCE: SIGNIFICANT CHANGE UP

## 2021-01-25 LAB
CULTURE RESULTS: NO GROWTH — SIGNIFICANT CHANGE UP
NON-GYNECOLOGICAL CYTOLOGY STUDY: SIGNIFICANT CHANGE UP
SPECIMEN SOURCE: SIGNIFICANT CHANGE UP
WNV IGG CSF IA-ACNC: NEGATIVE — SIGNIFICANT CHANGE UP
WNV IGM CSF IA-ACNC: NEGATIVE — SIGNIFICANT CHANGE UP

## 2021-01-26 LAB
% ALBUMIN: 56.5 % — SIGNIFICANT CHANGE UP
% ALPHA 1: 3.6 % — SIGNIFICANT CHANGE UP
% ALPHA 2: 10.6 % — SIGNIFICANT CHANGE UP
% BETA: 13.5 % — SIGNIFICANT CHANGE UP
% GAMMA: 15.8 % — SIGNIFICANT CHANGE UP
% M SPIKE: 4.9 % — SIGNIFICANT CHANGE UP
ALBUMIN SERPL ELPH-MCNC: 4.5 G/DL — SIGNIFICANT CHANGE UP (ref 3.6–5.5)
ALBUMIN/GLOB SERPL ELPH: 1.3 RATIO — SIGNIFICANT CHANGE UP
ALPHA1 GLOB SERPL ELPH-MCNC: 0.3 G/DL — SIGNIFICANT CHANGE UP (ref 0.1–0.4)
ALPHA2 GLOB SERPL ELPH-MCNC: 0.8 G/DL — SIGNIFICANT CHANGE UP (ref 0.5–1)
B-GLOBULIN SERPL ELPH-MCNC: 1.1 G/DL — HIGH (ref 0.5–1)
EBV PCR: SIGNIFICANT CHANGE UP IU/ML
GAMMA GLOBULIN: 1.3 G/DL — SIGNIFICANT CHANGE UP (ref 0.6–1.6)
INTERPRETATION SERPL IFE-IMP: SIGNIFICANT CHANGE UP
JCPYV DNA # CSF NAA+PROBE: SIGNIFICANT CHANGE UP COPIES/ML
M-SPIKE: 0.4 G/DL — HIGH (ref 0–0)
PROT PATTERN SERPL ELPH-IMP: SIGNIFICANT CHANGE UP
PROT SERPL-MCNC: 8 G/DL — SIGNIFICANT CHANGE UP (ref 6–8.3)
PROT SERPL-MCNC: 8 G/DL — SIGNIFICANT CHANGE UP (ref 6–8.3)
TM INTERPRETATION: SIGNIFICANT CHANGE UP

## 2021-01-27 ENCOUNTER — TRANSCRIPTION ENCOUNTER (OUTPATIENT)
Age: 51
End: 2021-01-27

## 2021-01-28 LAB
MBP CSF-MCNC: 4.4 NG/ML — HIGH (ref 0–3.7)
OLIGOCLONAL BANDS CSF ELPH-IMP: SIGNIFICANT CHANGE UP

## 2021-01-29 ENCOUNTER — LABORATORY RESULT (OUTPATIENT)
Age: 51
End: 2021-01-29

## 2021-01-29 ENCOUNTER — APPOINTMENT (OUTPATIENT)
Dept: NEUROLOGY | Facility: CLINIC | Age: 51
End: 2021-01-29
Payer: COMMERCIAL

## 2021-01-29 VITALS
HEART RATE: 64 BPM | TEMPERATURE: 98.2 F | OXYGEN SATURATION: 97 % | BODY MASS INDEX: 25.61 KG/M2 | WEIGHT: 150 LBS | DIASTOLIC BLOOD PRESSURE: 81 MMHG | SYSTOLIC BLOOD PRESSURE: 132 MMHG | HEIGHT: 64 IN

## 2021-01-29 DIAGNOSIS — G93.9 DISORDER OF BRAIN, UNSPECIFIED: ICD-10-CM

## 2021-01-29 DIAGNOSIS — G43.909 MIGRAINE, UNSPECIFIED, NOT INTRACTABLE, W/OUT STATUS MIGRAINOSUS: ICD-10-CM

## 2021-01-29 DIAGNOSIS — Z82.69 FAMILY HISTORY OF OTHER DISEASES OF THE MUSCULOSKELETAL SYSTEM AND CONNECTIVE TISSUE: ICD-10-CM

## 2021-01-29 DIAGNOSIS — R20.0 ANESTHESIA OF SKIN: ICD-10-CM

## 2021-01-29 DIAGNOSIS — Z80.8 FAMILY HISTORY OF MALIGNANT NEOPLASM OF OTHER ORGANS OR SYSTEMS: ICD-10-CM

## 2021-01-29 DIAGNOSIS — Z80.42 FAMILY HISTORY OF MALIGNANT NEOPLASM OF PROSTATE: ICD-10-CM

## 2021-01-29 DIAGNOSIS — G43.009 MIGRAINE W/OUT AURA, NOT INTRACTABLE, W/OUT STATUS MIGRAINOSUS: ICD-10-CM

## 2021-01-29 DIAGNOSIS — Z82.49 FAMILY HISTORY OF ISCHEMIC HEART DISEASE AND OTHER DISEASES OF THE CIRCULATORY SYSTEM: ICD-10-CM

## 2021-01-29 PROCEDURE — 99204 OFFICE O/P NEW MOD 45 MIN: CPT

## 2021-01-29 PROCEDURE — 99072 ADDL SUPL MATRL&STAF TM PHE: CPT

## 2021-01-29 RX ORDER — SUMATRIPTAN 50 MG/1
50 TABLET, FILM COATED ORAL
Qty: 12 | Refills: 5 | Status: ACTIVE | COMMUNITY
Start: 2021-01-29 | End: 1900-01-01

## 2021-01-29 RX ORDER — PROMETHAZINE HYDROCHLORIDE 12.5 MG/1
12.5 TABLET ORAL EVERY 6 HOURS
Qty: 30 | Refills: 5 | Status: ACTIVE | COMMUNITY
Start: 2021-01-29 | End: 1900-01-01

## 2021-01-29 RX ORDER — LORAZEPAM 2 MG/1
TABLET ORAL
Refills: 0 | Status: DISCONTINUED | COMMUNITY
End: 2021-01-29

## 2021-01-29 NOTE — PHYSICAL EXAM
[FreeTextEntry1] : Recent recall -1/3.  Mild difficulty with serial 7's, but able to spell WORLD backwards.  Remote memory, general fund of knowledge, attention, concentration, and language function were intact.  Pupils are equal, round and reactive to light and accommodation.  Funduscopic exam did not show any papilledema.  Visual fields are intact to confrontation.  Extraocular movements are full.  There is no nystagmus.  The facial muscles are symmetric.  Facial sensation is intact to light touch, temperature, and pinprick.  Hearing is intact to finger rub bilaterally.  Tongue is midline.  Palate elevates symmetrically.  Neck is supple.  There is no meningismus.  Shoulder shrugs are symmetric.  Motor exam demonstrates 5/5 strength in the proximal and distal muscles of the upper and lower extremities.  Tone and bulk were normal.  There is no pronator drift.  Reflexes are 2+ bilaterally in the biceps, triceps, brachioradialis, patellae and ankles.  Toes are downgoing.  There is no clonus.  Coordination demonstrates normal finger-to-nose, heel-to-shin and rapid alternating movements.  Gait demonstrates normal heel and toe walk.  Tandem gait is normal.  Sensory exam, normal light touch, pinprick, vibration sensation in upper extremities.  Normal LT, PP in LE, absent vibration in left big toe and mild decrease in position sense in toes. \par \par The patient is well-developed, well-nourished female in no acute distress.  Cardiac exam demonstrates a regular rate and rhythm.  No murmurs.  Carotids are 2+ bilaterally.  No bruits.  Abdomen is soft, nontender, and nondistended.  Bowel sounds are present.  Extremities showed no clubbing, cyanosis or edema. \par \par \par

## 2021-01-29 NOTE — ASSESSMENT
[FreeTextEntry1] : Abnormal brain MRI showing 3 enhancing lesion in corinne.  Neurologic exam is fairly benign showing only a little difficulty on short term recall, serial 7's and decreased vibration and position sense in toes.  I can't make a diagnosis of demyelinating disease at this time.  Outside of brainstem only the faintest and smallest of punctate WM changes seen, nonspecific.\par \par Plan:\par 1.  Repeat MRI brain w/wo contrast.\par 2.  MRA head given hx of mother with cerebral aneurysm.\par 3.  Cytokine panel and markers of inflammation.\par 4.  IF headaches recur/persist may use magnesium oxide 400 mg/day.\par 5.  Return in 4-6 weeks.

## 2021-01-29 NOTE — HISTORY OF PRESENT ILLNESS
[FreeTextEntry1] : Pt is 51 yo RH female c/o headaches beginning before the holidays, subsided then came back again, front or lower back of head.  Lately starting to feel weird pain in anterior neck around thyroid.  After the spinal tap headaches worse (not positional though).  Subsided over 3 days.  Currently only minimal headaches. Denies numbness, tingling, weakness, incoordination weakness.  A little dizziness. States it is more when headaches are around.  States it has been settling down.  Her last headache was yesterday. Being on computer can exacerbate them.  Takes fioricet only when headaches are really bad (takes at least two a day). Takes fioricet a few days a week.  First week took is several times a week.  Last two days hasn't needed it.\par \par States her general health is good.  Had FNA of thyroid due to having nodules for years being monitored.  No history of cancer.  \par \par States she had Covid in April.  Both her parents had Covid and she lost her mother due to Covid.  She had 99 fever for a couple of days and tightness in chest.  Now every once in a while when climbs stairs she will feel short of breath.  Denies memory trouble or concentration difficulty.  Has very high stress job at [employer].  Sleep is okay.  Can use natural sleep aids.\par \par 15 years ago passed out and felt like vomiting and dizzy and hit head.\par \par intermittent tingling in hands. \par \par Denies tick bites, rashes or extended time outdoors.\par \par States Dr. Fleming mentioned about her getting an MRA due to her family hx of aneurysm.\par \par First cousin on fathers side has MS x 20 years, and another cousin perhaps 2nd cousin on fathers side  of MS.\par \par CSF studies showed elevated MBP but no OCB's and normal CSF to serum IgG index.\par \par

## 2021-01-29 NOTE — REVIEW OF SYSTEMS
[Feeling Poorly] : feeling poorly [Feeling Tired] : feeling tired [Decr. Concentrating Ability] : decreased concentrating ability [Numbness] : numbness [Tingling] : tingling [Dizziness] : dizziness [Migraine Headache] : migraine headaches [Sleep Disturbances] : sleep disturbances [Anxiety] : anxiety [Dry Eyes] : dryness of the eyes [SOB on Exertion] : shortness of breath during exertion [Constipation] : constipation [Diarrhea] : diarrhea [Hot Flashes] : hot flashes [Fever] : no fever [Chills] : no chills [Recent Weight Gain (___ Lbs)] : no recent weight gain [Recent Weight Loss (___ Lbs)] : no recent weight loss [Confused or Disoriented] : no confusion [Memory Lapses or Loss] : no memory loss [Facial Weakness] : no facial weakness [Arm Weakness] : no arm weakness [Hand Weakness] : no hand weakness [Leg Weakness] : no leg weakness [Poor Coordination] : good coordination [Seizures] : no convulsions [Fainting] : no fainting [Lightheadedness] : no lightheadedness [Vertigo] : no vertigo [Cluster Headache] : no cluster headache [Tension Headache] : no tension-type headache [Difficulty Walking] : no difficulty walking [Inability to Walk] : able to walk [Ataxia] : no ataxia [Frequent Falls] : not falling [Suicidal] : not suicidal [Depression] : no depression [Eye Pain] : no eye pain [Eyesight Problems] : no eyesight problems [Earache] : no earache [Loss Of Hearing] : no hearing loss [Heart Rate Is Slow] : the heart rate was not slow [Heart Rate Is Fast] : the heart rate was not fast [Chest Pain] : no chest pain [Palpitations] : no palpitations [Shortness Of Breath] : no shortness of breath [Wheezing] : no wheezing [Cough] : no cough [Abdominal Pain] : no abdominal pain [Vomiting] : no vomiting [Heartburn] : no heartburn [Melena] : no melena [Dysuria] : no dysuria [Incontinence] : no incontinence [Joint Pain] : no joint pain [Joint Swelling] : no joint swelling [Joint Stiffness] : no joint stiffness [Skin Lesions] : no skin lesions [Skin Wound] : no skin wound [Muscle Weakness] : no muscle weakness [Easy Bleeding] : no tendency for easy bleeding [Easy Bruising] : no tendency for easy bruising [FreeTextEntry2] : years [de-identified] : 9 months more trouble concentrating, more sress, loss of mother, episodic numbness in hands or feet.  Had MRI of C-spine showing HNP's and bulges, told by [facility] surgeon who states she could have surgery but not good candidate for surgery because would have to have 2 surgeries.  HA's 5-9/10 + photophobia.  After fainted had tension HA for a couple of months (she hit her head).  Mother had brain aneurysm that was coiled. [de-identified] : light sleeper chronically [FreeTextEntry7] : IBS [de-identified] : perimenopausal

## 2021-02-01 LAB — B BURGDOR AB CSF-ACNC: SIGNIFICANT CHANGE UP

## 2021-02-05 DIAGNOSIS — E06.3 AUTOIMMUNE THYROIDITIS: ICD-10-CM

## 2021-02-05 DIAGNOSIS — D47.2 MONOCLONAL GAMMOPATHY: ICD-10-CM

## 2021-02-05 DIAGNOSIS — G93.49 AUTOIMMUNE THYROIDITIS: ICD-10-CM

## 2021-02-05 DIAGNOSIS — R76.0 RAISED ANTIBODY TITER: ICD-10-CM

## 2021-02-08 ENCOUNTER — APPOINTMENT (OUTPATIENT)
Dept: NEUROSURGERY | Facility: CLINIC | Age: 51
End: 2021-02-08

## 2021-02-08 ENCOUNTER — OUTPATIENT (OUTPATIENT)
Dept: OUTPATIENT SERVICES | Facility: HOSPITAL | Age: 51
LOS: 1 days | Discharge: HOME | End: 2021-02-08
Payer: COMMERCIAL

## 2021-02-08 ENCOUNTER — RESULT REVIEW (OUTPATIENT)
Age: 51
End: 2021-02-08

## 2021-02-08 DIAGNOSIS — G93.9 DISORDER OF BRAIN, UNSPECIFIED: ICD-10-CM

## 2021-02-08 DIAGNOSIS — Z98.82 BREAST IMPLANT STATUS: Chronic | ICD-10-CM

## 2021-02-08 DIAGNOSIS — Z98.890 OTHER SPECIFIED POSTPROCEDURAL STATES: Chronic | ICD-10-CM

## 2021-02-08 PROCEDURE — 70553 MRI BRAIN STEM W/O & W/DYE: CPT | Mod: 26

## 2021-02-08 PROCEDURE — 70544 MR ANGIOGRAPHY HEAD W/O DYE: CPT | Mod: 26,59

## 2021-02-12 RX ORDER — PREDNISONE 20 MG/1
20 TABLET ORAL
Qty: 56 | Refills: 0 | Status: ACTIVE | COMMUNITY
Start: 2021-02-12 | End: 1900-01-01

## 2021-02-12 RX ORDER — FAMOTIDINE 40 MG/1
40 TABLET, FILM COATED ORAL
Qty: 30 | Refills: 5 | Status: ACTIVE | COMMUNITY
Start: 2021-02-12 | End: 1900-01-01

## 2021-02-20 LAB
CULTURE RESULTS: SIGNIFICANT CHANGE UP
SPECIMEN SOURCE: SIGNIFICANT CHANGE UP

## 2021-03-06 ENCOUNTER — OUTPATIENT (OUTPATIENT)
Dept: OUTPATIENT SERVICES | Facility: HOSPITAL | Age: 51
LOS: 1 days | Discharge: HOME | End: 2021-03-06
Payer: COMMERCIAL

## 2021-03-06 DIAGNOSIS — Z98.82 BREAST IMPLANT STATUS: Chronic | ICD-10-CM

## 2021-03-06 DIAGNOSIS — Z98.890 OTHER SPECIFIED POSTPROCEDURAL STATES: Chronic | ICD-10-CM

## 2021-03-06 DIAGNOSIS — Z12.31 ENCOUNTER FOR SCREENING MAMMOGRAM FOR MALIGNANT NEOPLASM OF BREAST: ICD-10-CM

## 2021-03-06 PROCEDURE — 77067 SCR MAMMO BI INCL CAD: CPT | Mod: 26

## 2021-03-06 PROCEDURE — 77063 BREAST TOMOSYNTHESIS BI: CPT | Mod: 26

## 2021-03-13 LAB
CULTURE RESULTS: SIGNIFICANT CHANGE UP
SPECIMEN SOURCE: SIGNIFICANT CHANGE UP

## 2021-03-16 ENCOUNTER — APPOINTMENT (OUTPATIENT)
Dept: NEUROLOGY | Facility: CLINIC | Age: 51
End: 2021-03-16

## 2021-03-22 ENCOUNTER — LABORATORY RESULT (OUTPATIENT)
Age: 51
End: 2021-03-22

## 2021-03-22 ENCOUNTER — OUTPATIENT (OUTPATIENT)
Dept: OUTPATIENT SERVICES | Facility: HOSPITAL | Age: 51
LOS: 1 days | Discharge: HOME | End: 2021-03-22

## 2021-03-22 ENCOUNTER — APPOINTMENT (OUTPATIENT)
Dept: HEMATOLOGY ONCOLOGY | Facility: CLINIC | Age: 51
End: 2021-03-22
Payer: COMMERCIAL

## 2021-03-22 VITALS
BODY MASS INDEX: 26.46 KG/M2 | DIASTOLIC BLOOD PRESSURE: 67 MMHG | SYSTOLIC BLOOD PRESSURE: 118 MMHG | RESPIRATION RATE: 14 BRPM | HEART RATE: 66 BPM | WEIGHT: 155 LBS | TEMPERATURE: 97.8 F | HEIGHT: 64 IN

## 2021-03-22 DIAGNOSIS — Z98.890 OTHER SPECIFIED POSTPROCEDURAL STATES: Chronic | ICD-10-CM

## 2021-03-22 DIAGNOSIS — I82.4Z9 ACUTE EMBOLISM AND THROMBOSIS OF UNSPECIFIED DEEP VEINS OF UNSPECIFIED DISTAL LOWER EXTREMITY: ICD-10-CM

## 2021-03-22 DIAGNOSIS — Z82.49 FAMILY HISTORY OF ISCHEMIC HEART DISEASE AND OTHER DISEASES OF THE CIRCULATORY SYSTEM: ICD-10-CM

## 2021-03-22 DIAGNOSIS — Z98.82 BREAST IMPLANT STATUS: Chronic | ICD-10-CM

## 2021-03-22 PROCEDURE — 99205 OFFICE O/P NEW HI 60 MIN: CPT

## 2021-03-23 LAB
ALBUMIN SERPL ELPH-MCNC: 4.4 G/DL
ALP BLD-CCNC: 48 U/L
ALT SERPL-CCNC: 17 U/L
ANION GAP SERPL CALC-SCNC: 10 MMOL/L
AST SERPL-CCNC: 24 U/L
BILIRUB SERPL-MCNC: <0.2 MG/DL
BUN SERPL-MCNC: 14 MG/DL
CALCIUM SERPL-MCNC: 9.7 MG/DL
CHLORIDE SERPL-SCNC: 102 MMOL/L
CO2 SERPL-SCNC: 26 MMOL/L
CREAT SERPL-MCNC: 0.7 MG/DL
GLUCOSE SERPL-MCNC: 91 MG/DL
HCT VFR BLD CALC: 39.8 %
HGB BLD-MCNC: 13 G/DL
IGA SER QL IEP: 141 MG/DL
IGG SER QL IEP: 1232 MG/DL
IGM SER QL IEP: 209 MG/DL
LDH SERPL-CCNC: 187
MCHC RBC-ENTMCNC: 30.8 PG
MCHC RBC-ENTMCNC: 32.7 G/DL
MCV RBC AUTO: 94.3 FL
PLATELET # BLD AUTO: 294 K/UL
PMV BLD: 9.8 FL
POTASSIUM SERPL-SCNC: 4.2 MMOL/L
PROT SERPL-MCNC: 7.3 G/DL
RBC # BLD: 4.22 M/UL
RBC # FLD: 12.9 %
SODIUM SERPL-SCNC: 138 MMOL/L
T4 SERPL-MCNC: 5.9 UG/DL
THYROPEROXIDASE AB SERPL IA-ACNC: 125 IU/ML
TSH SERPL-ACNC: 3.77 UIU/ML
WBC # FLD AUTO: 4.82 K/UL

## 2021-03-23 NOTE — REASON FOR VISIT
[Initial Consultation] : an initial consultation for [FreeTextEntry2] : Monoclonal gammopathy and elevated anticardiolipin antibodies

## 2021-03-23 NOTE — PHYSICAL EXAM
[Fully active, able to carry on all pre-disease performance without restriction] : Status 0 - Fully active, able to carry on all pre-disease performance without restriction [Normal] : affect appropriate [de-identified] : But slightly anxious

## 2021-03-23 NOTE — ASSESSMENT
[FreeTextEntry1] : 1. Monoclonal gammopathy with IgG lambda, with an M-spike of only 0.4. I don't think her symptoms can be explained by this small spike. Free light chain disease should also be ruled out. There is nothing else to suggest amyloidosis.\par 2. Borderline high (13.7) IgM anticardiolipin antibody. This is not significant. This is considered "indeterminate" by our lab.\par \par The situation was discussed with the patient. She was reassured. \par We will repeat some of her blood work including CBC, CMP, SPEP with IFES, IgM and obtain free light chains. \par Further recommendations once those results are back.\par \par All her questions answered. \par \par She will keep all her appointments with all her other physicians and proceed with her screenings as recommended.\par \par

## 2021-03-23 NOTE — REVIEW OF SYSTEMS
[Fatigue] : fatigue [Dry Eyes] : dryness of the eyes [SOB on Exertion] : shortness of breath during exertion [Constipation] : constipation [Diarrhea] : diarrhea [Dizziness] : dizziness [Insomnia] : insomnia [Hot Flashes] : hot flashes [Negative] : Heme/Lymph [FreeTextEntry2] : Mostly in the afternoon. She works full time in an office [FreeTextEntry7] : Occasionally. She has IBS.

## 2021-03-23 NOTE — HISTORY OF PRESENT ILLNESS
[Disease:__________________________] : Disease: [unfilled] [de-identified] : The patient is a 50 year old white female referred by her neurologist, Dr. HORACIO Conrad, for evaluation of an elevated cardiolipin IgM and a monoclonal IgG lambda.\par The patient was following her neurologist for headaches and a weird type of pain around her neck. The patient has also been complaining of a little dizziness which have been occurring mostly concurrently with her headaches. She was evaluated by her neurologist and had MRI of the brain which showed 3 enhancing lesions in the corinne, unchanged from before. \par The patient underwent extensive work up and, in addition to the monoclonal gammopathy and borderline high anticardiolipin antibodies, she was also found to have significant elevation of thyroperoxidase antibodies. The patient has a well-known history of benign thyroid nodules for a while.\par Review of her blood work showed that the M-spike was only 0.4 and anticardiolipin antibody IgM was only 13.7 (upper limit being 12.5).\par The patient had a below knee DVT several years ago, treated conservatively with no subsequent complications. However, her father had a pulmonary embolism but circumstances not clear.

## 2021-03-23 NOTE — CONSULT LETTER
[Dear  ___] : Dear  [unfilled], [Consult Letter:] : I had the pleasure of evaluating your patient, [unfilled]. [Please see my note below.] : Please see my note below. [Consult Closing:] : Thank you very much for allowing me to participate in the care of this patient.  If you have any questions, please do not hesitate to contact me. [Sincerely,] : Sincerely, [FreeTextEntry2] : Dr. DIEGO Foster

## 2021-03-24 LAB
B2 GLYCOPROT1 IGA SERPL IA-ACNC: <5 SAU
B2 GLYCOPROT1 IGG SER-ACNC: <5 SGU
B2 GLYCOPROT1 IGM SER-ACNC: <5 SMU
CARDIOLIPIN IGM SER-MCNC: 15.3 MPL
CARDIOLIPIN IGM SER-MCNC: 8.6 GPL

## 2021-03-25 LAB
ALBUMIN MFR SERPL ELPH: 62.4 %
ALBUMIN SERPL-MCNC: 4.6 G/DL
ALBUMIN/GLOB SERPL: 1.7 RATIO
ALPHA1 GLOB MFR SERPL ELPH: 3.6 %
ALPHA1 GLOB SERPL ELPH-MCNC: 0.3 G/DL
ALPHA2 GLOB MFR SERPL ELPH: 8 %
ALPHA2 GLOB SERPL ELPH-MCNC: 0.6 G/DL
B-GLOBULIN MFR SERPL ELPH: 9.9 %
B-GLOBULIN SERPL ELPH-MCNC: 0.7 G/DL
GAMMA GLOB FLD ELPH-MCNC: 1.2 G/DL
GAMMA GLOB MFR SERPL ELPH: 16.1 %
INTERPRETATION SERPL IEP-IMP: NORMAL
M PROTEIN MFR SERPL ELPH: 6.2 %
M PROTEIN SPEC IFE-MCNC: NORMAL
MONOCLON BAND OBS SERPL: 0.5 G/DL
PROT SERPL-MCNC: 7.3 G/DL
PS IGA SER QL: 1
PS IGG SER QL: 10
PS IGM SER QL: 18

## 2021-03-29 LAB — CARDIOLIPIN AB SER IA-ACNC: POSITIVE

## 2021-04-12 DIAGNOSIS — R76.0 RAISED ANTIBODY TITER: ICD-10-CM

## 2021-04-12 DIAGNOSIS — D47.2 MONOCLONAL GAMMOPATHY: ICD-10-CM

## 2021-04-23 ENCOUNTER — APPOINTMENT (OUTPATIENT)
Dept: OTOLARYNGOLOGY | Facility: CLINIC | Age: 51
End: 2021-04-23
Payer: COMMERCIAL

## 2021-04-23 VITALS — HEIGHT: 64 IN | WEIGHT: 150 LBS | BODY MASS INDEX: 25.61 KG/M2

## 2021-04-23 PROCEDURE — 69210 REMOVE IMPACTED EAR WAX UNI: CPT

## 2021-04-23 PROCEDURE — 99072 ADDL SUPL MATRL&STAF TM PHE: CPT

## 2021-04-23 PROCEDURE — 99213 OFFICE O/P EST LOW 20 MIN: CPT | Mod: 25

## 2021-04-23 PROCEDURE — 31575 DIAGNOSTIC LARYNGOSCOPY: CPT

## 2021-04-23 NOTE — HISTORY OF PRESENT ILLNESS
[de-identified] : Patient presents today with c/o a ranula. Patient states it has been present for a few months. Patient notes it comes and goes. Patient has had a few procedures on it but it keeps getting infected and painful. She has previously seen Dr. Reyes for treatment but continues to have reoccurrence. \par She also has a h/o narrow ear canals and cerumen impaction. Her ears feel clogged. \par Pt has thyroid nodules and has been followed and has stable small nodules that have benign in the past. \par \par \par 6/30/2020: Patient following up today on ranula and multiple thyroid nodules. Patient sent for MRI; here to discuss results. Patient denies any new changes. \par \par Regarding ranula she noted a cyst in the mouth, underwent procedure by Dr Reyes, drainage, though it has continued to recur. She was scheduled for surgery by Dr Feng at [facility] prior to COVID, was cancelled due to COVID. She notes continued swelling of the cyst, 2 days ago most recently, she then can feel it bursting. Denies pain currently. \par \par Regarding thyroid nodules, she has had them biopsied, most recently 2 years ago. was told they were negative for malignancy, most recent us 6 months, no change per patient, this all being monitored by Dr Reyes.\par \par 8/7/2020: Patient here s/p ranula excision, possible transcervical approach 7/31/2020. Some pain; worse in the morning. She admits she is grinding her teeth at night. Pain 5/10.  Is using peridex BID. \par \par \par 8/31/2020: Patient presents today following up on ranula excision 7/31/2020. Patient doing well. No pain. Numbness.\par Patient also c/o clogged ears. No otalgia. Clogged sensation. She admits h/o wax build up and clogged ears. \par \par regarding her thyroid nodules, she reports she is due for another ultrasound, previous one was over 1 year ago, has had 3 previous FNAs. \par \par \par 12/22/2020: Patient presents today following up on ranula of floor of mouth. Patient c/o soreness around the area where the ranula was removed. \par Patient also c/o clogged ears. Excessive wax build up.\par Patient also c/o sinus infections. Headaches recently, nearly daily, for 1 week. She has been taking Tylenol Sinus, which helps, resolves after several hours. Pressure in her forehead and under the eyes.  She "may" be going through menopause. \par Patient had thyroid sonogram and FNA performed, [place] II.  [FreeTextEntry1] : \par 4/23/21: Patient presents today following up on ranula and clogged ears. Patient c/o clogged ears. No otalgia. No ear pressure. Clogged sensation. \par Patient also c/o throat discomfort. Patient admits h/o thyroid nodules. Some dysphagia from time to time. She feels that she has to drink liquids after eating because food doesn’t go all the way down. No throat burning. Consulted with endo 3 weeks ago.. Patient reports thyroid antibody elevated. Will f/u again in 2 months. She went to an endocrinologist, who noted possible start of Hashimoto's thyroiditis. She notes some "neck sensitivity" for several weeks, not pain but more like tightness, difficulty swallowing requiring immediate need to drink after taking a bite of food. \par \par Of note her mother had thyroid cancer, not sure what type.

## 2021-04-23 NOTE — ASSESSMENT
[FreeTextEntry1] : - well healed from ranula excision\par - regarding thyroid: right 1.7cm nodule [place] II, left upper thyroid nodule 1cm, f/up us recommended, this is ordered for summer 2021, remaining thyroid nodules do not require f/up. Of note, patient with maternal hx of thyroid cancer, will monitor thyroid closely\par - she notes thyroid ab elevated, seeing an endocrinologist for this, who notes possible early hashimoto's thyroiditis\par - f/up in 3 months with f/up thyroid us

## 2021-04-23 NOTE — PHYSICAL EXAM
[Bleeding] : bleeding is present [Normal] : no rashes [de-identified] : impacted cerumen removed with curette.  [de-identified] : crusting bilaterally.  [de-identified] : Oral mucosa: left floor of mouth incision well healed, soft and flat, minimal tethering of tongue to FOM. No left SMG swelling.

## 2021-04-23 NOTE — DATA REVIEWED
[de-identified] : relevant images and reports personally reviewed by me:\par EXAM:  US THYROID\par \par \par PROCEDURE DATE:  09/26/2020\par \par \par \par \par INTERPRETATION:  Clinical history: Thyroid nodule\par \par Technique: Thyroid sonogram.\par \par Comparison: None\par \par Findings:\par \par Echotexture: Heterogeneous.\par \par Thyroid size:\par \par Right lobe: 4.9 x 2.1 x 1.8 cm\par Left lobe: 4.7 x 1.4 x 1.8 cm\par Isthmus: 0.2 cm\par \par Vascularity: Normal.\par \par Nodule number: 1\par Size: 1.7 x 1.3 x 1.3 cm; Location: right; mid pole.\par Composition: solid/almost completely solid (2)\par Echogenicity: hypoechoic (2)\par Shape: wider-than-tall (0)\par Margins: smooth (0)\par Echogenic foci: none (0)\par ACR TI-RADS risk category: TR4 (4-6 points)\par \par Nodule number: 2\par Size: 1.2 x 1.3 x 0.9 cm; Location: right; mid pole.\par Composition: solid/almost completely solid (2)\par Echogenicity: isoechoic (1)\par Shape: wider-than-tall (0)\par Margins: smooth (0)\par Echogenic foci: none (0)\par ACR TI-RADS risk category: TR3 (3 points)\par \par Nodule number: 3\par Size: 0.8 x 0.7 x 0.5 cm; Location: right; lower pole.\par Composition: solid/almost completely solid (2)\par Echogenicity: hypoechoic (2)\par Shape: wider-than-tall (0)\par Margins: smooth (0)\par Echogenic foci: none (0)\par ACR TI-RADS risk category: TR4 (4-6 points)\par \par Nodule number: 4\par Size: 0.6 x 0.6 x 0.6 cm; Location: right; lower pole.\par Composition: solid/almost completely solid (2)\par Echogenicity: hypoechoic (2)\par Shape: wider-than-tall (0)\par Margins: smooth (0)\par Echogenic foci: none (0)\par ACR TI-RADS risk category: TR4 (4-6 points)\par \par Nodule number: 5\par Size: 0.8 x 0.6 x 0.5 cm; Location: left; mid pole.\par Composition: solid/almost completely solid (2)\par Echogenicity: isoechoic (1)\par Shape: wider-than-tall (0)\par Margins: smooth (0)\par Echogenic foci: none (0)\par ACR TI-RADS risk category: TR3 (3 points)\par \par Nodule number: 6\par Size: 1 x 0.6 x 0.5 cm; Location: left; upper pole.\par Composition: solid/almost completely solid (2)\par Echogenicity: hypoechoic (2)\par Shape: wider-than-tall (0)\par Margins: smooth (0)\par Echogenic foci: none (0)\par ACR TI-RADS risk category: TR4 (4-6 points)\par \par Nodule number: 7\par Size: 0.6 x 0.6 x 0.4 cm; Location: left; lower pole.\par Composition: solid/almost completely solid (2)\par Echogenicity: hypoechoic (2)\par Shape: wider-than-tall (0)\par Margins: smooth (0)\par Echogenic foci: none (0)\par ACR TI-RADS risk category: TR4 (4-6 points)\par \par Inferior to the left thyroid lower pole there is a cystic structure measuring 0.8 x 0.5 x 0.5 cm, nonspecific.\par \par Impression: Normal-sized heterogeneous thyroid gland, containing multiple nodules with their recommendations as below.\par \par Nodule 1: 1.7 cm, right midpole, TR 4 - FNA if = 1.5cm, follow-up if 1 - 1.4 cm.\par Recommendation: Ultrasound-guided fine-needle aspiration.\par \par Nodule 2: 1.2 cm, right midpole, TR 3- FNA if = 2.5cm, follow-up if 1.5 - 2.4 cm.\par Recommendation: No further follow-up.\par \par Nodule 3 and 4: 0.8 and 0.6 cm, right lower pole, TR 4 - FNA if = 1.5cm, follow-up if 1 - 1.4 cm.\par Recommendation: No further follow-up.\par \par Nodule 5: 0.8 cm, left mid pole, TR 3- FNA if = 2.5cm, follow-up if 1.5 - 2.4 cm.\par Recommendation: No further follow-up.\par \par Nodule 6: 1 cm, left upper pole, TR 4 - FNA if = 1.5cm, follow-up if 1 - 1.4 cm.\par Recommendation: Follow-up ultrasound in one year.\par \par Nodule 7: 0.6 cm, left lower pole, TR 4 - FNA if = 1.5cm, follow-up if 1 - 1.4 cm.\par Recommendation: No further follow-up.\par \par \par ACR TI-RADS recommendation 2017:\par TR5 - FNA if ? 1cm, follow-up if 0.5 - 0.9 cm every year for 5 years\par TR4 - FNA if ? 1.5cm, follow-up if 1 - 1.4 cm in 1, 2, 3 and 5 years\par TR3 - FNA if ? 2.5cm, follow-up if 1.5 - 2.4 cm in 1, 3 and 5 years\par TR2 (2 points) & TR1 (0 points) - No FNA or follow-up\par \par \par \par \par \par \par \par \par \par \par \par RADAMES VIVAS M.D., ATTENDING RADIOLOGIST\par This document has been electronically signed. Sep 28 2020 11:36AM\par \par \par \par \par  \par  [id number]\par

## 2021-06-23 ENCOUNTER — APPOINTMENT (OUTPATIENT)
Dept: NEUROLOGY | Facility: CLINIC | Age: 51
End: 2021-06-23

## 2021-07-23 ENCOUNTER — APPOINTMENT (OUTPATIENT)
Dept: OTOLARYNGOLOGY | Facility: CLINIC | Age: 51
End: 2021-07-23
Payer: COMMERCIAL

## 2021-07-23 ENCOUNTER — APPOINTMENT (OUTPATIENT)
Dept: OTOLARYNGOLOGY | Facility: CLINIC | Age: 51
End: 2021-07-23

## 2021-07-23 DIAGNOSIS — H61.23 IMPACTED CERUMEN, BILATERAL: ICD-10-CM

## 2021-07-23 DIAGNOSIS — E04.2 NONTOXIC MULTINODULAR GOITER: ICD-10-CM

## 2021-07-23 DIAGNOSIS — K11.6 MUCOCELE OF SALIVARY GLAND: ICD-10-CM

## 2021-07-23 PROCEDURE — 99214 OFFICE O/P EST MOD 30 MIN: CPT | Mod: 25

## 2021-07-23 PROCEDURE — 31575 DIAGNOSTIC LARYNGOSCOPY: CPT

## 2021-07-23 PROCEDURE — 99072 ADDL SUPL MATRL&STAF TM PHE: CPT

## 2021-07-23 PROCEDURE — 69210 REMOVE IMPACTED EAR WAX UNI: CPT

## 2021-07-23 NOTE — ASSESSMENT
[FreeTextEntry1] : I personally reviewed, interpreted and discussed patient's US images brought on a disc today. Stable overall. \par \par I discussed the pathophysiology of acid reflux and its effect on the laryngo-pharynx.\par I explained the need to first control the diet as much as possible with having early dinners, avoiding certain types of food in the evening including coffee, tomato sauce, chocolate, garlic...\par I also explained the need for medication when needed. I also discussed the effect and safety profile of the medication.\par \par \par RTC in 4M.\par \par Repeat Us in 6M. \par \par \par

## 2021-07-23 NOTE — HISTORY OF PRESENT ILLNESS
[de-identified] : Patient presents today with c/o a ranula. Patient states it has been present for a few months. Patient notes it comes and goes. Patient has had a few procedures on it but it keeps getting infected and painful. She has previously seen Dr. Reyes for treatment but continues to have reoccurrence. \par She also has a h/o narrow ear canals and cerumen impaction. Her ears feel clogged. \par Pt has thyroid nodules and has been followed and has stable small nodules that have benign in the past. \par \par \par 6/30/2020: Patient following up today on ranula and multiple thyroid nodules. Patient sent for MRI; here to discuss results. Patient denies any new changes. \par \par Regarding ranula she noted a cyst in the mouth, underwent procedure by Dr Reyes, drainage, though it has continued to recur. She was scheduled for surgery by Dr Feng at [facility] prior to COVID, was cancelled due to COVID. She notes continued swelling of the cyst, 2 days ago most recently, she then can feel it bursting. Denies pain currently. \par \par Regarding thyroid nodules, she has had them biopsied, most recently 2 years ago. was told they were negative for malignancy, most recent us 6 months, no change per patient, this all being monitored by Dr Reyes.\par \par 8/7/2020: Patient here s/p ranula excision, possible transcervical approach 7/31/2020. Some pain; worse in the morning. She admits she is grinding her teeth at night. Pain 5/10.  Is using peridex BID. \par \par \par 8/31/2020: Patient presents today following up on ranula excision 7/31/2020. Patient doing well. No pain. Numbness.\par Patient also c/o clogged ears. No otalgia. Clogged sensation. She admits h/o wax build up and clogged ears. \par \par regarding her thyroid nodules, she reports she is due for another ultrasound, previous one was over 1 year ago, has had 3 previous FNAs. \par \par \par 12/22/2020: Patient presents today following up on ranula of floor of mouth. Patient c/o soreness around the area where the ranula was removed. \par Patient also c/o clogged ears. Excessive wax build up.\par Patient also c/o sinus infections. Headaches recently, nearly daily, for 1 week. She has been taking Tylenol Sinus, which helps, resolves after several hours. Pressure in her forehead and under the eyes.  She "may" be going through menopause. \par Patient had thyroid sonogram and FNA performed, [place] II. \par \par \par 4/23/21: Patient presents today following up on ranula and clogged ears. Patient c/o clogged ears. No otalgia. No ear pressure. Clogged sensation. \par Patient also c/o throat discomfort. Patient admits h/o thyroid nodules. Some dysphagia from time to time. She feels that she has to drink liquids after eating because food doesn’t go all the way down. No throat burning. Consulted with endo 3 weeks ago.. Patient reports thyroid antibody elevated. Will f/u again in 2 months. She went to an endocrinologist, who noted possible start of Hashimoto's thyroiditis. She notes some "neck sensitivity" for several weeks, not pain but more like tightness, difficulty swallowing requiring immediate need to drink after taking a bite of food. \par \par Of note her mother had thyroid cancer, not sure what type.  [FreeTextEntry1] : \par 7/23/21: Patient presents today following up on clogged ears and thyroid nodules. Patient saw Dr. Liz in the past. She c/o clogged ears. No otalgia. No tinnitus. \par Patient sent for thyroid sonogram here to discuss results. She admits dysphagia still present from time to time with solids, intermittent. She feels that she has to drink water for food to go down at times.

## 2021-07-26 ENCOUNTER — APPOINTMENT (OUTPATIENT)
Dept: OTOLARYNGOLOGY | Facility: CLINIC | Age: 51
End: 2021-07-26

## 2021-08-12 ENCOUNTER — TRANSCRIPTION ENCOUNTER (OUTPATIENT)
Age: 51
End: 2021-08-12

## 2021-08-12 ENCOUNTER — EMERGENCY (EMERGENCY)
Facility: HOSPITAL | Age: 51
LOS: 0 days | Discharge: HOME | End: 2021-08-12
Attending: EMERGENCY MEDICINE | Admitting: EMERGENCY MEDICINE
Payer: COMMERCIAL

## 2021-08-12 VITALS
SYSTOLIC BLOOD PRESSURE: 107 MMHG | DIASTOLIC BLOOD PRESSURE: 60 MMHG | RESPIRATION RATE: 17 BRPM | WEIGHT: 149.91 LBS | HEART RATE: 74 BPM | HEIGHT: 64 IN | TEMPERATURE: 97 F | OXYGEN SATURATION: 100 %

## 2021-08-12 VITALS
HEART RATE: 72 BPM | RESPIRATION RATE: 18 BRPM | DIASTOLIC BLOOD PRESSURE: 71 MMHG | SYSTOLIC BLOOD PRESSURE: 122 MMHG | OXYGEN SATURATION: 100 %

## 2021-08-12 DIAGNOSIS — Z98.82 BREAST IMPLANT STATUS: Chronic | ICD-10-CM

## 2021-08-12 DIAGNOSIS — R07.2 PRECORDIAL PAIN: ICD-10-CM

## 2021-08-12 DIAGNOSIS — Z20.822 CONTACT WITH AND (SUSPECTED) EXPOSURE TO COVID-19: ICD-10-CM

## 2021-08-12 DIAGNOSIS — Z98.890 OTHER SPECIFIED POSTPROCEDURAL STATES: Chronic | ICD-10-CM

## 2021-08-12 DIAGNOSIS — R06.02 SHORTNESS OF BREATH: ICD-10-CM

## 2021-08-12 DIAGNOSIS — Z86.16 PERSONAL HISTORY OF COVID-19: ICD-10-CM

## 2021-08-12 DIAGNOSIS — Z83.2 FAMILY HISTORY OF DISEASES OF THE BLOOD AND BLOOD-FORMING ORGANS AND CERTAIN DISORDERS INVOLVING THE IMMUNE MECHANISM: ICD-10-CM

## 2021-08-12 DIAGNOSIS — Z86.69 PERSONAL HISTORY OF OTHER DISEASES OF THE NERVOUS SYSTEM AND SENSE ORGANS: ICD-10-CM

## 2021-08-12 LAB
ALBUMIN SERPL ELPH-MCNC: 4.5 G/DL — SIGNIFICANT CHANGE UP (ref 3.5–5.2)
ALP SERPL-CCNC: 46 U/L — SIGNIFICANT CHANGE UP (ref 30–115)
ALT FLD-CCNC: 13 U/L — SIGNIFICANT CHANGE UP (ref 0–41)
ANION GAP SERPL CALC-SCNC: 9 MMOL/L — SIGNIFICANT CHANGE UP (ref 7–14)
APTT BLD: 31.3 SEC — SIGNIFICANT CHANGE UP (ref 27–39.2)
AST SERPL-CCNC: 16 U/L — SIGNIFICANT CHANGE UP (ref 0–41)
BASOPHILS # BLD AUTO: 0.02 K/UL — SIGNIFICANT CHANGE UP (ref 0–0.2)
BASOPHILS NFR BLD AUTO: 0.5 % — SIGNIFICANT CHANGE UP (ref 0–1)
BILIRUB SERPL-MCNC: 0.4 MG/DL — SIGNIFICANT CHANGE UP (ref 0.2–1.2)
BUN SERPL-MCNC: 12 MG/DL — SIGNIFICANT CHANGE UP (ref 10–20)
CALCIUM SERPL-MCNC: 9.5 MG/DL — SIGNIFICANT CHANGE UP (ref 8.5–10.1)
CHLORIDE SERPL-SCNC: 104 MMOL/L — SIGNIFICANT CHANGE UP (ref 98–110)
CO2 SERPL-SCNC: 26 MMOL/L — SIGNIFICANT CHANGE UP (ref 17–32)
CREAT SERPL-MCNC: 0.6 MG/DL — LOW (ref 0.7–1.5)
D DIMER BLD IA.RAPID-MCNC: 232 NG/ML DDU — HIGH (ref 0–230)
EOSINOPHIL # BLD AUTO: 0.05 K/UL — SIGNIFICANT CHANGE UP (ref 0–0.7)
EOSINOPHIL NFR BLD AUTO: 1.2 % — SIGNIFICANT CHANGE UP (ref 0–8)
GLUCOSE SERPL-MCNC: 88 MG/DL — SIGNIFICANT CHANGE UP (ref 70–99)
HCG SERPL QL: NEGATIVE — SIGNIFICANT CHANGE UP
HCT VFR BLD CALC: 41.4 % — SIGNIFICANT CHANGE UP (ref 37–47)
HGB BLD-MCNC: 13.5 G/DL — SIGNIFICANT CHANGE UP (ref 12–16)
IMM GRANULOCYTES NFR BLD AUTO: 0.2 % — SIGNIFICANT CHANGE UP (ref 0.1–0.3)
INR BLD: 1.1 RATIO — SIGNIFICANT CHANGE UP (ref 0.65–1.3)
LYMPHOCYTES # BLD AUTO: 1.7 K/UL — SIGNIFICANT CHANGE UP (ref 1.2–3.4)
LYMPHOCYTES # BLD AUTO: 41.7 % — SIGNIFICANT CHANGE UP (ref 20.5–51.1)
MCHC RBC-ENTMCNC: 30.1 PG — SIGNIFICANT CHANGE UP (ref 27–31)
MCHC RBC-ENTMCNC: 32.6 G/DL — SIGNIFICANT CHANGE UP (ref 32–37)
MCV RBC AUTO: 92.4 FL — SIGNIFICANT CHANGE UP (ref 81–99)
MONOCYTES # BLD AUTO: 0.33 K/UL — SIGNIFICANT CHANGE UP (ref 0.1–0.6)
MONOCYTES NFR BLD AUTO: 8.1 % — SIGNIFICANT CHANGE UP (ref 1.7–9.3)
NEUTROPHILS # BLD AUTO: 1.97 K/UL — SIGNIFICANT CHANGE UP (ref 1.4–6.5)
NEUTROPHILS NFR BLD AUTO: 48.3 % — SIGNIFICANT CHANGE UP (ref 42.2–75.2)
NRBC # BLD: 0 /100 WBCS — SIGNIFICANT CHANGE UP (ref 0–0)
NT-PROBNP SERPL-SCNC: 55 PG/ML — SIGNIFICANT CHANGE UP (ref 0–300)
PLATELET # BLD AUTO: 328 K/UL — SIGNIFICANT CHANGE UP (ref 130–400)
POTASSIUM SERPL-MCNC: 4.4 MMOL/L — SIGNIFICANT CHANGE UP (ref 3.5–5)
POTASSIUM SERPL-SCNC: 4.4 MMOL/L — SIGNIFICANT CHANGE UP (ref 3.5–5)
PROT SERPL-MCNC: 7.1 G/DL — SIGNIFICANT CHANGE UP (ref 6–8)
PROTHROM AB SERPL-ACNC: 12.6 SEC — SIGNIFICANT CHANGE UP (ref 9.95–12.87)
RBC # BLD: 4.48 M/UL — SIGNIFICANT CHANGE UP (ref 4.2–5.4)
RBC # FLD: 13.2 % — SIGNIFICANT CHANGE UP (ref 11.5–14.5)
SARS-COV-2 RNA SPEC QL NAA+PROBE: SIGNIFICANT CHANGE UP
SODIUM SERPL-SCNC: 139 MMOL/L — SIGNIFICANT CHANGE UP (ref 135–146)
TROPONIN T SERPL-MCNC: <0.01 NG/ML — SIGNIFICANT CHANGE UP
WBC # BLD: 4.08 K/UL — LOW (ref 4.8–10.8)
WBC # FLD AUTO: 4.08 K/UL — LOW (ref 4.8–10.8)

## 2021-08-12 PROCEDURE — 71275 CT ANGIOGRAPHY CHEST: CPT | Mod: 26,MA

## 2021-08-12 PROCEDURE — 71046 X-RAY EXAM CHEST 2 VIEWS: CPT | Mod: 26

## 2021-08-12 PROCEDURE — 99285 EMERGENCY DEPT VISIT HI MDM: CPT

## 2021-08-12 PROCEDURE — 93010 ELECTROCARDIOGRAM REPORT: CPT | Mod: 76

## 2021-08-12 RX ORDER — LORATADINE 10 MG/1
1 TABLET ORAL
Qty: 0 | Refills: 0 | DISCHARGE

## 2021-08-12 NOTE — ED PROVIDER NOTE - ATTENDING CONTRIBUTION TO CARE
CP as above, constant, has questionable clotting disorder prompting workup, labs and studies reviewed and d/w patient, will d/c to f/u with PMD and cardio. Patient counseled regarding conditions which should prompt return.

## 2021-08-12 NOTE — ED PROVIDER NOTE - CARE PROVIDER_API CALL
Esteban Vilchis  CARDIOVASCULAR DISEASE  [address]  [place] [address]  Phone: [phone]  Fax: [phone]  Follow Up Time: 1-3 Days

## 2021-08-12 NOTE — ED PROVIDER NOTE - OBJECTIVE STATEMENT
51 y/o F PMHx COVID 1 year ago, migraines presents to ED with burning constant substernal chest heaviness associated with AREVALO x1 week. Pt reports CP worse on exertion as well. No nausea or vomiting. Pt reports sister has hx of PE. No recent travel or surgeries, no hemoptysis, no leg swelling noted. Denies cough, congestion, fevers.

## 2021-08-12 NOTE — ED PROVIDER NOTE - PROGRESS NOTE DETAILS
DL - Bedside ECHO performed, normal contractility no effusions. Will follow up with PMD and cardiology.

## 2021-08-12 NOTE — ED PROVIDER NOTE - NSFOLLOWUPCLINICS_GEN_ALL_ED_FT
[facility]  [facility] [address] [address]  Phone: [phone]  Fax:   Follow Up Time: 1-3 Days

## 2021-08-12 NOTE — ED PROVIDER NOTE - PATIENT PORTAL LINK FT
You can access the FollowMyHealth Patient Portal offered by [facility] by registering at the following website: [email or web address]. By joining [facility]’s FollowMyHealth portal, you will also be able to view your health information using other applications (apps) compatible with our system.

## 2021-08-12 NOTE — ED PROVIDER NOTE - NSFOLLOWUPINSTRUCTIONS_ED_ALL_ED_FT
Chest Pain    Chest pain can be caused by many different conditions which may or may not be dangerous. Causes include heartburn, lung infections, heart attack, blood clot in lungs, skin infections, strain or damage to muscle, cartilage, or bones, etc. Lab tests or other studies including an electrocardiogram (EKG) may have been performed to find the cause of your pain. Make sure to follow up with a cardiologist or as instructed by your health care professional.    SEEK IMMEDIATE MEDICAL CARE IF YOU HAVE THE FOLLOWING SYMPTOMS: worsening chest pain, coughing up blood, unexplained back/neck/jaw pain, severe abdominal pain, dizziness or lightheadedness, shortness of breath, sweaty or clammy skin, vomiting, or racing heart beat. These symptoms may represent a serious problem that is an emergency. Do not wait to see if the symptoms will go away. Get medical help right away. Call your local emergency services (911 in the [place] Do not drive yourself to the hospital.      Shortness of breath    Shortness of breath means you have trouble breathing and could indicate a medical problem. Causes include lung diseases, heart disease, low amount of red blood cells (anemia), poor physical fitness, being overweight, smoking, etc. Your health care provider may not be able to find a cause for your shortness of breath after your exam. In this case, it is important to have a follow-up exam with your primary care physician as instructed. If medicines were prescribed, take them as directed for the full length of time directed. Refrain from tobacco products.    SEEK IMMEDIATE MEDICAL CARE IF YOU HAVE THE FOLLOWING SYMPTOMS: worsening shortness of breath, chest pain, back pain, abdominal pain, fever, coughing up blood, lightheadedness/dizziness.

## 2021-08-12 NOTE — ED PROVIDER NOTE - NS ED ROS FT
Review of Systems:  CONSTITUTIONAL: No fever, No diaphoresis   SKIN: No rash  HEMATOLOGIC: No abnormal bleeding or bruising  EYES: No eye pain, No blurred vision  ENT: No sore throat, No neck pain, No rhinorrhea   RESPIRATORY: +shortness of breath, No cough  CARDIAC: +chest pain, No palpitations  GI: No abdominal pain, No nausea, No vomiting, No diarrhea, No constipation, No bright red blood per rectum or melena. No flank pain  : No dysuria, frequency, hematuria.   MUSCULOSKELETAL: No joint paint, No swelling, No back pain  NEUROLOGIC: No numbness, No focal weakness, No headache, No dizziness  All other systems negative, unless specified in HPI

## 2021-08-12 NOTE — ED PROVIDER NOTE - PHYSICAL EXAMINATION
CONSTITUTIONAL: Well-developed; well-nourished; in no acute distress.   SKIN: warm, dry  HEAD: Normocephalic; atraumatic.  EYES: no conjunctival injection. EOMI.   ENT: No nasal discharge; airway clear.  NECK: Supple; non tender.  CARD: S1, S2 normal; Regular rate and rhythm.   RESP: No wheezes, rales or rhonchi.  ABD: soft ntnd.   EXT: Normal ROM.  No clubbing, cyanosis or edema.   LYMPH: No acute cervical adenopathy.  NEURO: Alert, oriented, grossly unremarkable. No FND.   PSYCH: Cooperative, appropriate.

## 2021-08-13 ENCOUNTER — APPOINTMENT (OUTPATIENT)
Dept: OTOLARYNGOLOGY | Facility: CLINIC | Age: 51
End: 2021-08-13

## 2021-08-17 ENCOUNTER — OUTPATIENT (OUTPATIENT)
Dept: OUTPATIENT SERVICES | Facility: HOSPITAL | Age: 51
LOS: 1 days | Discharge: HOME | End: 2021-08-17
Payer: COMMERCIAL

## 2021-08-17 DIAGNOSIS — R90.89 OTHER ABNORMAL FINDINGS ON DIAGNOSTIC IMAGING OF CENTRAL NERVOUS SYSTEM: ICD-10-CM

## 2021-08-17 DIAGNOSIS — Z98.82 BREAST IMPLANT STATUS: Chronic | ICD-10-CM

## 2021-08-17 DIAGNOSIS — Z98.890 OTHER SPECIFIED POSTPROCEDURAL STATES: Chronic | ICD-10-CM

## 2021-08-17 PROCEDURE — 70551 MRI BRAIN STEM W/O DYE: CPT | Mod: 26

## 2021-08-25 ENCOUNTER — TRANSCRIPTION ENCOUNTER (OUTPATIENT)
Age: 51
End: 2021-08-25

## 2021-10-21 ENCOUNTER — LABORATORY RESULT (OUTPATIENT)
Age: 51
End: 2021-10-21

## 2021-10-21 ENCOUNTER — APPOINTMENT (OUTPATIENT)
Dept: HEMATOLOGY ONCOLOGY | Facility: CLINIC | Age: 51
End: 2021-10-21
Payer: COMMERCIAL

## 2021-10-21 ENCOUNTER — OUTPATIENT (OUTPATIENT)
Dept: OUTPATIENT SERVICES | Facility: HOSPITAL | Age: 51
LOS: 1 days | Discharge: HOME | End: 2021-10-21

## 2021-10-21 DIAGNOSIS — Z98.82 BREAST IMPLANT STATUS: Chronic | ICD-10-CM

## 2021-10-21 DIAGNOSIS — Z98.890 OTHER SPECIFIED POSTPROCEDURAL STATES: Chronic | ICD-10-CM

## 2021-10-21 LAB
HCT VFR BLD CALC: 40.3 %
HGB BLD-MCNC: 13.4 G/DL
MCHC RBC-ENTMCNC: 31.2 PG
MCHC RBC-ENTMCNC: 33.3 G/DL
MCV RBC AUTO: 93.9 FL
PLATELET # BLD AUTO: 322 K/UL
PMV BLD: 9.5 FL
RBC # BLD: 4.29 M/UL
RBC # FLD: 13.4 %
WBC # FLD AUTO: 5.07 K/UL

## 2021-10-21 PROCEDURE — 99214 OFFICE O/P EST MOD 30 MIN: CPT

## 2021-10-22 DIAGNOSIS — R53.1 WEAKNESS: ICD-10-CM

## 2021-10-22 DIAGNOSIS — D47.2 MONOCLONAL GAMMOPATHY: ICD-10-CM

## 2021-10-22 LAB
ALBUMIN SERPL ELPH-MCNC: 4.3 G/DL
ALP BLD-CCNC: 45 U/L
ALT SERPL-CCNC: 16 U/L
ANION GAP SERPL CALC-SCNC: 16 MMOL/L
AST SERPL-CCNC: 22 U/L
BILIRUB SERPL-MCNC: <0.2 MG/DL
BUN SERPL-MCNC: 10 MG/DL
CALCIUM SERPL-MCNC: 9.4 MG/DL
CHLORIDE SERPL-SCNC: 102 MMOL/L
CO2 SERPL-SCNC: 21 MMOL/L
CREAT SERPL-MCNC: 0.7 MG/DL
DEPRECATED KAPPA LC FREE/LAMBDA SER: 0.11 RATIO
GLUCOSE SERPL-MCNC: 88 MG/DL
IGG SER QL IEP: 1182 MG/DL
KAPPA LC CSF-MCNC: 10.85 MG/DL
KAPPA LC SERPL-MCNC: 1.18 MG/DL
LDH SERPL-CCNC: 209
POTASSIUM SERPL-SCNC: 4.6 MMOL/L
PROT SERPL-MCNC: 7 G/DL
SODIUM SERPL-SCNC: 139 MMOL/L

## 2021-10-22 NOTE — ASSESSMENT
[FreeTextEntry1] : 1. Monoclonal gammopathy with a small M-protein spike of 0.5 IgG lambda.\par 2. Asthenia, although the patient is still continuing to work regularly.\par 3. Non-specific, 3 enhancing brain lesions, followed by her neurologists. No clear, definitive diagnosis except that it was not a neoplastic lesion.\par 4. Borderline elevation of anticardiolipin antibodies secondary to mild elevation of anticardiolipin IgM at 15.3 (upper limit being 12.5).\par \par The situation was discussed at length with the patient.\par We will repeat her blood work including CBC, CMP, LDH, SPEP with IFES, IgG, free light chains, anticardiolipin antibodies. Will also obtain Factor V Leiden because of family history.\par \par Further recommendations after the above results are available.\par May be we should also consider the possibility of amyloidosis although this is not a typical presentation.\par \par All questions answered.\par \par If all stable, the patient may be seen again in 6 months for follow up.

## 2021-10-22 NOTE — CONSULT LETTER
[Dear  ___] : Dear  [unfilled], [Courtesy Letter:] : I had the pleasure of seeing your patient, [unfilled], in my office today. [Please see my note below.] : Please see my note below. [Consult Closing:] : Thank you very much for allowing me to participate in the care of this patient.  If you have any questions, please do not hesitate to contact me. [Sincerely,] : Sincerely, [FreeTextEntry2] : Dr. DIEGO Foster [FreeTextEntry3] : Dr. RENUKA Linares

## 2021-10-22 NOTE — REVIEW OF SYSTEMS
[Fatigue] : fatigue [Recent Change In Weight] : ~T recent weight change [Dry Eyes] : dryness of the eyes [SOB on Exertion] : shortness of breath during exertion [Constipation] : constipation [Joint Pain] : joint pain [Joint Stiffness] : joint stiffness [Dizziness] : dizziness [Insomnia] : insomnia [Negative] : Heme/Lymph [FreeTextEntry2] : Gained weight [FreeTextEntry6] : Sometimes. [FreeTextEntry7] : Irritable bowel.  [de-identified] : Occasionally

## 2021-10-22 NOTE — HISTORY OF PRESENT ILLNESS
[Disease:__________________________] : Disease: [unfilled] [de-identified] : The patient is coming for her regularly scheduled follow up for her monoclonal gammopathy with IgG lambda.She also had slightly elevated anticardiolipin antibodies.\par The patient's main problem seems to be headaches for the last year. She had extensive work up including an MRI of the brain which had shown non-specific findings.\par In addition, she feels extremely tired. She has started going back to the office at least twice a week; the work seems to be very intense. Also, she had developed body aches and has gained about 8 lbs.\par She had Covid infection last year and her problems started afterwards. She had the vaccine thereafter.

## 2021-10-22 NOTE — RESULTS/DATA
[FreeTextEntry1] : 1. Monoclonal gammopathy with a small M-protein spike of 0.5 IgG lambda.\par 2. Asthenia, although the patient is still continuing to work regularly.\par 3. Non-specific, 3 enhancing brain lesions, followed by her neurologists. No clear, definitive diagnosis except that it was not a neoplastic lesion.\par \par The situation was discussed at length with the patient.\par We will repeat her blood work including CBC, CMP, LDH, SPEP with IFES, IgG, free light chains.\par Further recommendations after the above results are available.\par May be we should also consider the possibility of amyloidosis although this is not a typical presentation.\par \par All questions answered.\par \par If all stable, the patient may be seen again in 6 months for follow up.

## 2021-10-25 LAB
CARDIOLIPIN AB SER IA-ACNC: POSITIVE
DNA PLOIDY SPEC FC-IMP: NORMAL

## 2021-10-26 LAB
ALBUMIN MFR SERPL ELPH: 60 %
ALBUMIN SERPL-MCNC: 4.2 G/DL
ALBUMIN/GLOB SERPL: 1.5 RATIO
ALPHA1 GLOB MFR SERPL ELPH: 3.8 %
ALPHA1 GLOB SERPL ELPH-MCNC: 0.3 G/DL
ALPHA2 GLOB MFR SERPL ELPH: 9.6 %
ALPHA2 GLOB SERPL ELPH-MCNC: 0.7 G/DL
B-GLOBULIN MFR SERPL ELPH: 9.9 %
B-GLOBULIN SERPL ELPH-MCNC: 0.7 G/DL
GAMMA GLOB FLD ELPH-MCNC: 1.2 G/DL
GAMMA GLOB MFR SERPL ELPH: 16.7 %
INTERPRETATION SERPL IEP-IMP: NORMAL
M PROTEIN MFR SERPL ELPH: 6 %
M PROTEIN SPEC IFE-MCNC: NORMAL
MONOCLON BAND OBS SERPL: 0.4 G/DL
PROT SERPL-MCNC: 7 G/DL
PROT SERPL-MCNC: 7.2 G/DL

## 2021-10-27 LAB
CARDIOLIPIN IGM SER-MCNC: 20.9 MPL
CARDIOLIPIN IGM SER-MCNC: 5.4 GPL

## 2022-04-02 ENCOUNTER — OUTPATIENT (OUTPATIENT)
Dept: OUTPATIENT SERVICES | Facility: HOSPITAL | Age: 52
LOS: 1 days | Discharge: HOME | End: 2022-04-02
Payer: COMMERCIAL

## 2022-04-02 DIAGNOSIS — Z98.82 BREAST IMPLANT STATUS: Chronic | ICD-10-CM

## 2022-04-02 DIAGNOSIS — Z12.31 ENCOUNTER FOR SCREENING MAMMOGRAM FOR MALIGNANT NEOPLASM OF BREAST: ICD-10-CM

## 2022-04-02 DIAGNOSIS — Z98.890 OTHER SPECIFIED POSTPROCEDURAL STATES: Chronic | ICD-10-CM

## 2022-04-02 PROCEDURE — 77063 BREAST TOMOSYNTHESIS BI: CPT | Mod: 26

## 2022-04-02 PROCEDURE — 77067 SCR MAMMO BI INCL CAD: CPT | Mod: 26

## 2022-04-29 ENCOUNTER — RESULT REVIEW (OUTPATIENT)
Age: 52
End: 2022-04-29

## 2022-04-29 ENCOUNTER — OUTPATIENT (OUTPATIENT)
Dept: OUTPATIENT SERVICES | Facility: HOSPITAL | Age: 52
LOS: 1 days | Discharge: HOME | End: 2022-04-29
Payer: COMMERCIAL

## 2022-04-29 DIAGNOSIS — Z98.890 OTHER SPECIFIED POSTPROCEDURAL STATES: Chronic | ICD-10-CM

## 2022-04-29 DIAGNOSIS — Z98.82 BREAST IMPLANT STATUS: Chronic | ICD-10-CM

## 2022-04-29 DIAGNOSIS — M54.2 CERVICALGIA: ICD-10-CM

## 2022-04-29 PROCEDURE — 72141 MRI NECK SPINE W/O DYE: CPT | Mod: 26

## 2022-05-11 ENCOUNTER — APPOINTMENT (OUTPATIENT)
Dept: NEUROSURGERY | Facility: CLINIC | Age: 52
End: 2022-05-11
Payer: COMMERCIAL

## 2022-05-11 PROCEDURE — 99213 OFFICE O/P EST LOW 20 MIN: CPT

## 2022-05-12 NOTE — HISTORY OF PRESENT ILLNESS
[FreeTextEntry1] : Mrs. Méndez, who is right handed, presents today in follow up reporting of chronic neck pain for more than 5 years, but worsening the past 3months. Reports it radiates to bilateral shoulder, and rarely in her arms, worse on the right. \par Attended PT 3 years ago, which helped.\par She is under the care of Dr. Botello has received JOSE MARTIN x3/4 which helped for a few months. \par Dr. Botello is planning on giving her a nerve block. \par Symptoms is worse when lifting items, working on computer, and sleeping; and it is alleviated with moist heat pad and massage\par \par MRI of the cervical spine w/o contrast done on 4/29/22 at [facility] showed congenitally narrow cervical spinal canal.  Moderate/severe neural foraminal stenosis most pronounced at C4-5 bilaterally, and C5-6

## 2022-06-28 ENCOUNTER — LABORATORY RESULT (OUTPATIENT)
Age: 52
End: 2022-06-28

## 2022-06-30 RX ORDER — DOCUSATE SODIUM 100 MG
100 CAPSULE ORAL
Qty: 14 | Refills: 0 | Status: ACTIVE | COMMUNITY
Start: 2022-06-30 | End: 1900-01-01

## 2022-06-30 NOTE — ASU PATIENT PROFILE, ADULT - FALL HARM RISK - UNIVERSAL INTERVENTIONS
Bed in lowest position, wheels locked, appropriate side rails in place/Call bell, personal items and telephone in reach/Instruct patient to call for assistance before getting out of bed or chair/Non-slip footwear when patient is out of bed/Gunlock to call system/Physically safe environment - no spills, clutter or unnecessary equipment/Purposeful Proactive Rounding/Room/bathroom lighting operational, light cord in reach

## 2022-07-01 ENCOUNTER — TRANSCRIPTION ENCOUNTER (OUTPATIENT)
Age: 52
End: 2022-07-01

## 2022-07-01 ENCOUNTER — APPOINTMENT (OUTPATIENT)
Dept: NEUROSURGERY | Facility: HOSPITAL | Age: 52
End: 2022-07-01

## 2022-07-01 ENCOUNTER — OUTPATIENT (OUTPATIENT)
Dept: OUTPATIENT SERVICES | Facility: HOSPITAL | Age: 52
LOS: 1 days | Discharge: HOME | End: 2022-07-01

## 2022-07-01 VITALS
SYSTOLIC BLOOD PRESSURE: 100 MMHG | TEMPERATURE: 98 F | RESPIRATION RATE: 14 BRPM | HEART RATE: 81 BPM | OXYGEN SATURATION: 100 % | DIASTOLIC BLOOD PRESSURE: 57 MMHG | WEIGHT: 149.91 LBS | HEIGHT: 64 IN

## 2022-07-01 VITALS
RESPIRATION RATE: 16 BRPM | OXYGEN SATURATION: 100 % | DIASTOLIC BLOOD PRESSURE: 63 MMHG | HEART RATE: 54 BPM | SYSTOLIC BLOOD PRESSURE: 114 MMHG

## 2022-07-01 DIAGNOSIS — Z98.82 BREAST IMPLANT STATUS: Chronic | ICD-10-CM

## 2022-07-01 DIAGNOSIS — Z98.890 OTHER SPECIFIED POSTPROCEDURAL STATES: Chronic | ICD-10-CM

## 2022-07-01 LAB
ABO RH CONFIRMATION: SIGNIFICANT CHANGE UP
BLD GP AB SCN SERPL QL: SIGNIFICANT CHANGE UP

## 2022-07-01 PROCEDURE — 22551 ARTHRD ANT NTRBDY CERVICAL: CPT

## 2022-07-01 PROCEDURE — 22853 INSJ BIOMECHANICAL DEVICE: CPT

## 2022-07-01 PROCEDURE — 22551 ARTHRD ANT NTRBDY CERVICAL: CPT | Mod: AS

## 2022-07-01 PROCEDURE — 22853 INSJ BIOMECHANICAL DEVICE: CPT | Mod: AS

## 2022-07-01 PROCEDURE — 22552 ARTHRD ANT NTRBD CERVICAL EA: CPT | Mod: AS

## 2022-07-01 PROCEDURE — 22552 ARTHRD ANT NTRBD CERVICAL EA: CPT

## 2022-07-01 RX ORDER — HYDROMORPHONE HYDROCHLORIDE 2 MG/ML
1 INJECTION INTRAMUSCULAR; INTRAVENOUS; SUBCUTANEOUS
Refills: 0 | Status: DISCONTINUED | OUTPATIENT
Start: 2022-07-01 | End: 2022-07-01

## 2022-07-01 RX ORDER — MEPERIDINE HYDROCHLORIDE 50 MG/ML
12.5 INJECTION INTRAMUSCULAR; INTRAVENOUS; SUBCUTANEOUS
Refills: 0 | Status: DISCONTINUED | OUTPATIENT
Start: 2022-07-01 | End: 2022-07-01

## 2022-07-01 RX ORDER — HYDROMORPHONE HYDROCHLORIDE 2 MG/ML
0.5 INJECTION INTRAMUSCULAR; INTRAVENOUS; SUBCUTANEOUS
Refills: 0 | Status: DISCONTINUED | OUTPATIENT
Start: 2022-07-01 | End: 2022-07-01

## 2022-07-01 RX ORDER — APREPITANT 80 MG/1
40 CAPSULE ORAL ONCE
Refills: 0 | Status: DISCONTINUED | OUTPATIENT
Start: 2022-07-01 | End: 2022-07-15

## 2022-07-01 RX ORDER — TOPIRAMATE 25 MG
75 TABLET ORAL
Qty: 0 | Refills: 0 | DISCHARGE

## 2022-07-01 RX ORDER — CELECOXIB 200 MG/1
200 CAPSULE ORAL ONCE
Refills: 0 | Status: DISCONTINUED | OUTPATIENT
Start: 2022-07-01 | End: 2022-07-01

## 2022-07-01 RX ORDER — ASCORBIC ACID 60 MG
1 TABLET,CHEWABLE ORAL
Qty: 0 | Refills: 0 | DISCHARGE

## 2022-07-01 RX ORDER — ACETAMINOPHEN 500 MG
1000 TABLET ORAL ONCE
Refills: 0 | Status: DISCONTINUED | OUTPATIENT
Start: 2022-07-01 | End: 2022-07-01

## 2022-07-01 RX ORDER — METHOCARBAMOL 500 MG/1
1 TABLET, FILM COATED ORAL
Qty: 42 | Refills: 0
Start: 2022-07-01 | End: 2022-07-14

## 2022-07-01 RX ORDER — PANTOPRAZOLE SODIUM 20 MG/1
0 TABLET, DELAYED RELEASE ORAL
Qty: 0 | Refills: 0 | DISCHARGE

## 2022-07-01 RX ORDER — ONDANSETRON 8 MG/1
4 TABLET, FILM COATED ORAL ONCE
Refills: 0 | Status: COMPLETED | OUTPATIENT
Start: 2022-07-01 | End: 2022-07-01

## 2022-07-01 RX ORDER — SODIUM CHLORIDE 9 MG/ML
1000 INJECTION, SOLUTION INTRAVENOUS
Refills: 0 | Status: DISCONTINUED | OUTPATIENT
Start: 2022-07-01 | End: 2022-07-01

## 2022-07-01 RX ORDER — GABAPENTIN 400 MG/1
300 CAPSULE ORAL ONCE
Refills: 0 | Status: DISCONTINUED | OUTPATIENT
Start: 2022-07-01 | End: 2022-07-01

## 2022-07-01 RX ADMIN — HYDROMORPHONE HYDROCHLORIDE 0.5 MILLIGRAM(S): 2 INJECTION INTRAMUSCULAR; INTRAVENOUS; SUBCUTANEOUS at 11:25

## 2022-07-01 RX ADMIN — HYDROMORPHONE HYDROCHLORIDE 0.5 MILLIGRAM(S): 2 INJECTION INTRAMUSCULAR; INTRAVENOUS; SUBCUTANEOUS at 11:00

## 2022-07-01 RX ADMIN — ONDANSETRON 4 MILLIGRAM(S): 8 TABLET, FILM COATED ORAL at 12:40

## 2022-07-01 NOTE — PRE-ANESTHESIA EVALUATION ADULT - NSANTHOSAYNRD_GEN_A_CORE
denies/No. CARROLL screening performed.  STOP BANG Legend: 0-2 = LOW Risk; 3-4 = INTERMEDIATE Risk; 5-8 = HIGH Risk

## 2022-07-01 NOTE — ASU DISCHARGE PLAN (ADULT/PEDIATRIC) - CARE PROVIDER_API CALL
Claire Salazar)  Neurosurgery  [address], [address]  [place] [address]  Phone: [phone]  Fax: [phone]  Follow Up Time: 2 weeks

## 2022-07-01 NOTE — ASU DISCHARGE PLAN (ADULT/PEDIATRIC) - NS MD DC FALL RISK RISK
For information on Fall & Injury Prevention, visit: [email or web address] OR  [email or web address] OR  https://www.cdc.gov/steadi/patient.html

## 2022-07-01 NOTE — ASU DISCHARGE PLAN (ADULT/PEDIATRIC) - ASU DC SPECIAL INSTRUCTIONSFT
- Upon discharge,  please call to schedule a follow up with Dr. Salazar in 1-2 weeks.  - Upon discharge, please call to make a follow up appointment with your primary care provider to discuss your recent hospitalization/operation.  - Keep dressings dry for 48 hours after which you may remove dressing and cleanse with soap and water in the shower (no scrubbing). Leave the steri strips (white tape) on your incisions, they will fall off on their own. Run water and soap over incision sites and pat dry (no scrubbing). No submerging your incision sites in water (i.e. no swimming or baths) for 2-3 weeks and avoid exposing the area to jets/streams of water.   - You can resume your normal activities as tolerated, but avoid heavy (>15lb.) lifting and strenuous exercise for 4-6 weeks.    - ***You were prescribed narcotic pain medications, take these only as needed.  Your pain should subside over the next few days.  While taking narcotic pain medications, you should not drive or operate heavy machinery.  If you were prescribed Percocet (oxycodone-acetaminophen) and are taking it with other medications containing acetaminophen (Tylenol), reduce your dose of percocet so that you  do not exceed 3,000 mg of acetaminophen per day.  - Narcotic pain medicine tends to cause constipation, you have can take an over-the-counter stool softener 3 times a day to help prevent that. Hold the stool softener if you start to have loose stools.  - If you experience fevers, chills, increasing abdominal pain, nausea, vomiting, inability to pass stool or gas, bleeding, or any other acute symptoms, please call your doctor and report to the emergency room immediately for further management.

## 2022-07-06 DIAGNOSIS — M47.22 OTHER SPONDYLOSIS WITH RADICULOPATHY, CERVICAL REGION: ICD-10-CM

## 2022-07-06 DIAGNOSIS — F41.9 ANXIETY DISORDER, UNSPECIFIED: ICD-10-CM

## 2022-07-13 ENCOUNTER — APPOINTMENT (OUTPATIENT)
Dept: NEUROSURGERY | Facility: CLINIC | Age: 52
End: 2022-07-13

## 2022-07-13 PROCEDURE — 99024 POSTOP FOLLOW-UP VISIT: CPT

## 2022-07-13 NOTE — HISTORY OF PRESENT ILLNESS
[FreeTextEntry1] : s/p C5-6, 6-7 ACDF.  Doing well.  Complaining pain underneath her right shoulder blade.  Otherwise, no radicular pain.  Swallowing discomfort resolved.\par \par Wound clean dry and intact\par \par Continue restrictions\par return in 4 weeks with xrays \par PT in 4 weeks

## 2022-07-22 ENCOUNTER — OUTPATIENT (OUTPATIENT)
Dept: OUTPATIENT SERVICES | Facility: HOSPITAL | Age: 52
LOS: 1 days | Discharge: HOME | End: 2022-07-22
Payer: COMMERCIAL

## 2022-07-22 DIAGNOSIS — R91.1 SOLITARY PULMONARY NODULE: ICD-10-CM

## 2022-07-22 DIAGNOSIS — Z98.890 OTHER SPECIFIED POSTPROCEDURAL STATES: Chronic | ICD-10-CM

## 2022-07-22 DIAGNOSIS — Z98.82 BREAST IMPLANT STATUS: Chronic | ICD-10-CM

## 2022-07-22 PROCEDURE — 71250 CT THORAX DX C-: CPT | Mod: 26

## 2022-07-25 ENCOUNTER — APPOINTMENT (OUTPATIENT)
Dept: HEMATOLOGY ONCOLOGY | Facility: CLINIC | Age: 52
End: 2022-07-25

## 2022-07-25 ENCOUNTER — LABORATORY RESULT (OUTPATIENT)
Age: 52
End: 2022-07-25

## 2022-07-25 ENCOUNTER — OUTPATIENT (OUTPATIENT)
Dept: OUTPATIENT SERVICES | Facility: HOSPITAL | Age: 52
LOS: 1 days | Discharge: HOME | End: 2022-07-25

## 2022-07-25 VITALS
TEMPERATURE: 97.3 F | BODY MASS INDEX: 28.15 KG/M2 | OXYGEN SATURATION: 100 % | DIASTOLIC BLOOD PRESSURE: 73 MMHG | RESPIRATION RATE: 14 BRPM | SYSTOLIC BLOOD PRESSURE: 87 MMHG | WEIGHT: 164 LBS | HEART RATE: 80 BPM

## 2022-07-25 VITALS — HEART RATE: 71 BPM | DIASTOLIC BLOOD PRESSURE: 67 MMHG | SYSTOLIC BLOOD PRESSURE: 94 MMHG

## 2022-07-25 DIAGNOSIS — Z98.82 BREAST IMPLANT STATUS: Chronic | ICD-10-CM

## 2022-07-25 DIAGNOSIS — Z98.890 OTHER SPECIFIED POSTPROCEDURAL STATES: Chronic | ICD-10-CM

## 2022-07-25 PROCEDURE — 99214 OFFICE O/P EST MOD 30 MIN: CPT

## 2022-07-26 DIAGNOSIS — D47.2 MONOCLONAL GAMMOPATHY: ICD-10-CM

## 2022-07-26 DIAGNOSIS — R76.0 RAISED ANTIBODY TITER: ICD-10-CM

## 2022-07-26 LAB
ALBUMIN SERPL ELPH-MCNC: 4.3 G/DL
ALP BLD-CCNC: 63 U/L
ALT SERPL-CCNC: 15 U/L
ANION GAP SERPL CALC-SCNC: 10 MMOL/L
AST SERPL-CCNC: 20 U/L
BILIRUB SERPL-MCNC: <0.2 MG/DL
BUN SERPL-MCNC: 9 MG/DL
CALCIUM SERPL-MCNC: 9.3 MG/DL
CHLORIDE SERPL-SCNC: 101 MMOL/L
CO2 SERPL-SCNC: 26 MMOL/L
CREAT SERPL-MCNC: 0.6 MG/DL
DEPRECATED KAPPA LC FREE/LAMBDA SER: 0.15 RATIO
EGFR: 107 ML/MIN/1.73M2
GLUCOSE SERPL-MCNC: 72 MG/DL
HCT VFR BLD CALC: 39.7 %
HGB BLD-MCNC: 12.6 G/DL
IGG SER QL IEP: 1122 MG/DL
KAPPA LC CSF-MCNC: 10.56 MG/DL
KAPPA LC SERPL-MCNC: 1.61 MG/DL
LDH SERPL-CCNC: 210
MCHC RBC-ENTMCNC: 29.6 PG
MCHC RBC-ENTMCNC: 31.7 G/DL
MCV RBC AUTO: 93.4 FL
PLATELET # BLD AUTO: 319 K/UL
PMV BLD: 9.6 FL
POTASSIUM SERPL-SCNC: 4.5 MMOL/L
PROT SERPL-MCNC: 7 G/DL
RBC # BLD: 4.25 M/UL
RBC # FLD: 13.4 %
SODIUM SERPL-SCNC: 137 MMOL/L
WBC # FLD AUTO: 5.07 K/UL

## 2022-07-26 NOTE — PHYSICAL EXAM
[Restricted in physically strenuous activity but ambulatory and able to carry out work of a light or sedentary nature] : Status 1- Restricted in physically strenuous activity but ambulatory and able to carry out work of a light or sedentary nature, e.g., light house work, office work [Normal] : affect appropriate [de-identified] : S/P recent surgery. Scar looks well healed.

## 2022-07-26 NOTE — REVIEW OF SYSTEMS
[Fatigue] : fatigue [Negative] : Heme/Lymph [FreeTextEntry4] : Still some discomfort in the neck [de-identified] : Pain in the legs at night.

## 2022-07-26 NOTE — ASSESSMENT
[FreeTextEntry1] : 1. IgG monoclonal gammopathy, spike of 0.4 at her last visit in October of 2021. Clinically stable.\par 2. Mild elevation of anticardiolipin IgM. No evidence of thromboembolic issues. No evidence of "triple" antiphospholipid abnormalities.\par \par The situation was discussed with the patient.\par Will obtain blood work including CBC, CMP, LDH, SPEP with IFES, free light chains, anticardiolipin antibodies.\par Further recommendations, as needed, after the above results are available.\par If all stable, the patient may be seen in 6-9 months for follow up.\par \par All questions answered.

## 2022-07-26 NOTE — HISTORY OF PRESENT ILLNESS
[Disease:__________________________] : Disease: [unfilled] [de-identified] : The patient is coming for her regularly scheduled follow up for his monoclonal gammopathy with IgG lambda and mild elevation of anticardiolipin antibodies.\par The patient recently underwent next surgery which was a fusion of C4,5,6.  The surgery went well and she is recovering from it.\par She is still recovering from the surgery although she is working from home.\par She is denying any cough or shortness of breath. No problems with urine or bowel movements.\par She is up to date with all her screenings.\par  [de-identified] : IgG lambda, anticardiolipin IgM positive

## 2022-07-26 NOTE — REASON FOR VISIT
[Follow-Up Visit] : a follow-up visit for [Monoclonal Gammopathy] : monoclonal gammopathy [FreeTextEntry2] : Elevated anticardiolipin antibodies

## 2022-07-27 LAB
B2 GLYCOPROT1 IGA SERPL IA-ACNC: <5 SAU
B2 GLYCOPROT1 IGG SER-ACNC: <5 SGU
B2 GLYCOPROT1 IGM SER-ACNC: <5 SMU
CARDIOLIPIN IGM SER-MCNC: 13 MPL
CARDIOLIPIN IGM SER-MCNC: <5 GPL

## 2022-08-03 ENCOUNTER — NON-APPOINTMENT (OUTPATIENT)
Age: 52
End: 2022-08-03

## 2022-08-03 LAB
ALBUMIN MFR SERPL ELPH: 60 %
ALBUMIN SERPL-MCNC: 4.7 G/DL
ALBUMIN/GLOB SERPL: 1.5 RATIO
ALPHA1 GLOB MFR SERPL ELPH: 3.8 %
ALPHA1 GLOB SERPL ELPH-MCNC: 0.3 G/DL
ALPHA2 GLOB MFR SERPL ELPH: 10.5 %
ALPHA2 GLOB SERPL ELPH-MCNC: 0.8 G/DL
B-GLOBULIN MFR SERPL ELPH: 9.8 %
B-GLOBULIN SERPL ELPH-MCNC: 0.8 G/DL
GAMMA GLOB FLD ELPH-MCNC: 1.2 G/DL
GAMMA GLOB MFR SERPL ELPH: 15.9 %
INTERPRETATION SERPL IEP-IMP: NORMAL
M PROTEIN MFR SERPL ELPH: 6 %
M PROTEIN SPEC IFE-MCNC: NORMAL
MONOCLON BAND OBS SERPL: 0.5 G/DL
PROT SERPL-MCNC: 7.8 G/DL
PROT SERPL-MCNC: 7.8 G/DL

## 2022-08-08 ENCOUNTER — APPOINTMENT (OUTPATIENT)
Dept: NEUROSURGERY | Facility: CLINIC | Age: 52
End: 2022-08-08

## 2022-08-08 PROCEDURE — 99024 POSTOP FOLLOW-UP VISIT: CPT

## 2022-08-08 NOTE — ASSESSMENT
[FreeTextEntry1] : This is a 50 yo F who presents for her second post-operative encounter after ACDF C4-5, 5-6 which took place 7/1/2022. We have suggested that she initiate physical therapy 2-3 times weekly in conjunction with home PT program.\par \par She is to return to our office in six weeks for continued care/treatment efforts.

## 2022-08-08 NOTE — REVIEW OF SYSTEMS
[Fever] : no fever [Chills] : no chills [Confused or Disoriented] : no confusion [Memory Lapses or Loss] : no memory loss [Seizures] : no convulsions [Dizziness] : no dizziness [Suicidal] : not suicidal [Eye Pain] : no eye pain [Dysuria] : no dysuria [Incontinence] : no incontinence [Easy Bleeding] : no tendency for easy bleeding [Easy Bruising] : no tendency for easy bruising

## 2022-08-08 NOTE — HISTORY OF PRESENT ILLNESS
[FreeTextEntry1] : This is a 50 yo F who is s/p C5-6, 6-7 ACDF which was completed 7/1/2022.  Post-operatively, she continues to improve.  Pain reported emanating to the right shoulder, otherwise no weakness or sensation deficit reported to the right upper extremity. Swallowing continues to improve, slight difficulty noted with larger objects such as vitamins.\par \par X-ray C spine [facility]- 8/3/2022 reviewed,  good alignment of hardware\par \par Wound clean dry and intact\par \par Continue restrictions\par \par Physical therapy to the cervical site 2-3 times weekly in conjunction with home PT program advised; patient agreeable to initiate\par \par We will revisit with her in 6 weeks and she can contact the office with any concerns in the interim.

## 2022-09-14 ENCOUNTER — APPOINTMENT (OUTPATIENT)
Dept: ENDOCRINOLOGY | Facility: CLINIC | Age: 52
End: 2022-09-14

## 2022-09-14 VITALS
BODY MASS INDEX: 26.63 KG/M2 | OXYGEN SATURATION: 98 % | HEART RATE: 70 BPM | WEIGHT: 156 LBS | SYSTOLIC BLOOD PRESSURE: 122 MMHG | DIASTOLIC BLOOD PRESSURE: 70 MMHG | TEMPERATURE: 97.2 F | HEIGHT: 64 IN

## 2022-09-14 DIAGNOSIS — R53.83 OTHER FATIGUE: ICD-10-CM

## 2022-09-14 DIAGNOSIS — E04.2 NONTOXIC MULTINODULAR GOITER: ICD-10-CM

## 2022-09-14 DIAGNOSIS — R63.5 ABNORMAL WEIGHT GAIN: ICD-10-CM

## 2022-09-14 DIAGNOSIS — E78.5 HYPERLIPIDEMIA, UNSPECIFIED: ICD-10-CM

## 2022-09-14 PROCEDURE — 99204 OFFICE O/P NEW MOD 45 MIN: CPT

## 2022-09-14 NOTE — HISTORY OF PRESENT ILLNESS
[FreeTextEntry1] : 52 year old female with known  monoclonal gammopathy and Elevated anticardiolipin antibodies who present for evaluation of Hashimoto, thyroid nodules and weight gain \par \par \par # Hashimoto \par - found ot have + tpo antibodies on blood work done in 3/2021 , tft's at that time normal \par - thyroid US with homogenous echotexture and normal vascularity \par - + fatigue, weight gain, cold intolerance, constipation. denies hair loss but is on biotin  \par \par # thyroid nodules ;\par - known to have nodules for years, she states had at least 3 FNAS in the past with Dr hdz and now is following regularly with yearly thyroid US \par - last US done was 6/2022 with 2 right side nodules, TR3 stable and one left subcm  TR3 nodule \par - no neck radiation in the past , has dysphagia but attributed to GERD ( [facility]Ad Gi work up ) \par -mother had thyroid cancer

## 2022-09-14 NOTE — ASSESSMENT
[Exercise/Effect on Glucose] : exercise/effect on glucose [Weight Loss] : weight loss [FreeTextEntry1] : 52 year old female with known  monoclonal gammopathy and Elevated anticardiolipin antibodies who present for evaluation of Hashimoto, thyroid nodules and weight gain \par \par \par # + tpo antibodies \par - found ot have + tpo antibodies on blood work done in 3/2021 , tft's at that time normal \par - thyroid US with homogenous echotexture and normal vascularity \par - + fatigue, weight gain, cold intolerance, constipation. denies hair loss but is on biotin  \par - do labs now after  holding biotin  , will call her with results , reviewed indications for treatment with levothyroxine \par \par # thyroid nodules ;\par - known to have nodules for years, she states had at least 3 FNAS in the past with Dr hdz and now is following regularly with yearly thyroid US \par - last US done was 6/2022 with 2 right side nodules, TR3 stable and one left subcm  TR3 nodule \par - no neck radiation in the past , has dysphagia but attributed to GERD ( HAd Gi work up ) \par -mother had thyroid cancer \par - will get record from Dr hdz to review , for now thyroid US yearly ( next 6/2023) \par \par # weight gain\par - following with a nutritionist now , will resume exercise as she is healing from surgery \par - reviewed different types of diet , like intermittent fasting, keto , low carb and calories counting as well as gluten free in hashimoto

## 2022-09-14 NOTE — DATA REVIEWED
[FreeTextEntry1] : 3/2021: TPO  125 positive  TSH 3.77    T4 5.9 \par 10/2021 : normal TSh and FT4 \par

## 2022-09-14 NOTE — REVIEW OF SYSTEMS
[Fatigue] : fatigue [Recent Weight Gain (___ Lbs)] : recent weight gain: [unfilled] lbs [Nausea] : nausea [Constipation] : constipation [Headaches] : headaches [Cold Intolerance] : cold intolerance [Visual Field Defect] : no visual field defect [Chest Pain] : no chest pain [Palpitations] : no palpitations [Fast Heart Rate] : heart rate is not fast [Vomiting] : no vomiting [As Noted in HPI] : as noted in HPI [Heat Intolerance] : no heat intolerance

## 2022-09-14 NOTE — PHYSICAL EXAM
[Alert] : alert [Well Nourished] : well nourished [No Acute Distress] : no acute distress [No Proptosis] : no proptosis [No Lid Lag] : no lid lag [No Respiratory Distress] : no respiratory distress [No Accessory Muscle Use] : no accessory muscle use [Clear to Auscultation] : lungs were clear to auscultation bilaterally [Normal S1, S2] : normal S1 and S2 [No Murmurs] : no murmurs [Regular Rhythm] : with a regular rhythm [No Edema] : no peripheral edema [Not Distended] : not distended [No Stigmata of Cushings Syndrome] : no stigmata of Cushings Syndrome [Abdominal Striae] : no abdominal striae [Acne] : no acne [Hirsutism] : no hirsutism [No Tremors] : no tremors [Oriented x3] : oriented to person, place, and time [de-identified] : right side nodules palpable , no LAD

## 2022-09-14 NOTE — REASON FOR VISIT
[Initial Evaluation] : an initial evaluation [Thyroid nodule/ MNG] : thyroid nodule/ MNG [Weight Management/Obesity] : weight management/obesity [Other___] : [unfilled]

## 2022-09-19 ENCOUNTER — APPOINTMENT (OUTPATIENT)
Dept: NEUROSURGERY | Facility: CLINIC | Age: 52
End: 2022-09-19

## 2022-09-19 VITALS — WEIGHT: 155 LBS | HEIGHT: 64 IN | BODY MASS INDEX: 26.46 KG/M2

## 2022-09-19 PROCEDURE — 99024 POSTOP FOLLOW-UP VISIT: CPT

## 2022-09-19 RX ORDER — OXYCODONE AND ACETAMINOPHEN 5; 325 MG/1; MG/1
5-325 TABLET ORAL
Qty: 42 | Refills: 0 | Status: DISCONTINUED | COMMUNITY
Start: 2022-06-30 | End: 2022-09-19

## 2022-09-19 NOTE — HISTORY OF PRESENT ILLNESS
[FreeTextEntry1] : Ms. OSEGUERA is s/p C5/6 C6/7 ACDF. Surgery date: 7/1/22. \par \par Preop radiculopathy has improved. She continues to feel trapezial pain / spasms. \par \par Denies dysphagia / dysphonia. \par \par Incision healing well. No signs of erythema, infection, or discharge. \par \par She is doing PT. She has completed ~4 weeks of therapy to date. \par \par Xrays cervical ([facility]) 8/3/22: good alignment / position of hardware.

## 2022-09-19 NOTE — ASSESSMENT
[FreeTextEntry1] : Ms. OSEGUERA is doing well. Will continue with physical therapy. She will consider acupuncture as well. I will see her back in 6 weeks for reassessment.

## 2022-10-05 LAB
CHOLEST SERPL-MCNC: 263 MG/DL
HDLC SERPL-MCNC: 71 MG/DL
LDLC SERPL CALC-MCNC: 176 MG/DL
NONHDLC SERPL-MCNC: 192 MG/DL
T4 FREE SERPL-MCNC: 1.3 NG/DL
TRIGL SERPL-MCNC: 82 MG/DL
TSH SERPL-ACNC: 4.44 UIU/ML

## 2022-10-05 RX ORDER — LEVOTHYROXINE SODIUM 0.03 MG/1
25 TABLET ORAL DAILY
Qty: 90 | Refills: 1 | Status: ACTIVE | COMMUNITY
Start: 2022-10-05 | End: 1900-01-01

## 2022-10-14 ENCOUNTER — OUTPATIENT (OUTPATIENT)
Dept: OUTPATIENT SERVICES | Facility: HOSPITAL | Age: 52
LOS: 1 days | Discharge: HOME | End: 2022-10-14

## 2022-10-14 DIAGNOSIS — Z98.890 OTHER SPECIFIED POSTPROCEDURAL STATES: Chronic | ICD-10-CM

## 2022-10-14 DIAGNOSIS — Z98.82 BREAST IMPLANT STATUS: Chronic | ICD-10-CM

## 2022-10-14 DIAGNOSIS — N64.4 MASTODYNIA: ICD-10-CM

## 2022-10-14 PROCEDURE — 76642 ULTRASOUND BREAST LIMITED: CPT | Mod: 26,50

## 2022-10-14 PROCEDURE — 77066 DX MAMMO INCL CAD BI: CPT | Mod: 26

## 2022-10-14 PROCEDURE — G0279: CPT | Mod: 26

## 2022-10-19 ENCOUNTER — RESULT REVIEW (OUTPATIENT)
Age: 52
End: 2022-10-19

## 2022-10-19 ENCOUNTER — OUTPATIENT (OUTPATIENT)
Dept: OUTPATIENT SERVICES | Facility: HOSPITAL | Age: 52
LOS: 1 days | Discharge: HOME | End: 2022-10-19

## 2022-10-19 DIAGNOSIS — Z98.82 BREAST IMPLANT STATUS: Chronic | ICD-10-CM

## 2022-10-19 DIAGNOSIS — Z98.890 OTHER SPECIFIED POSTPROCEDURAL STATES: Chronic | ICD-10-CM

## 2022-10-19 PROCEDURE — 88305 TISSUE EXAM BY PATHOLOGIST: CPT | Mod: 26

## 2022-10-19 PROCEDURE — 19083 BX BREAST 1ST LESION US IMAG: CPT | Mod: LT

## 2022-10-19 PROCEDURE — 77065 DX MAMMO INCL CAD UNI: CPT | Mod: 26,LT

## 2022-10-19 PROCEDURE — 76098 X-RAY EXAM SURGICAL SPECIMEN: CPT | Mod: 26

## 2022-10-20 LAB — SURGICAL PATHOLOGY STUDY: SIGNIFICANT CHANGE UP

## 2022-10-23 DIAGNOSIS — N60.32 FIBROSCLEROSIS OF LEFT BREAST: ICD-10-CM

## 2022-10-23 DIAGNOSIS — N64.89 OTHER SPECIFIED DISORDERS OF BREAST: ICD-10-CM

## 2022-10-31 ENCOUNTER — APPOINTMENT (OUTPATIENT)
Dept: NEUROSURGERY | Facility: CLINIC | Age: 52
End: 2022-10-31

## 2022-10-31 VITALS — WEIGHT: 150 LBS | HEIGHT: 64 IN | BODY MASS INDEX: 25.61 KG/M2

## 2022-10-31 DIAGNOSIS — Z09 ENCOUNTER FOR FOLLOW-UP EXAMINATION AFTER COMPLETED TREATMENT FOR CONDITIONS OTHER THAN MALIGNANT NEOPLASM: ICD-10-CM

## 2022-10-31 PROCEDURE — 99214 OFFICE O/P EST MOD 30 MIN: CPT

## 2022-10-31 RX ORDER — CYCLOBENZAPRINE HYDROCHLORIDE 10 MG/1
10 TABLET, FILM COATED ORAL EVERY 8 HOURS
Qty: 21 | Refills: 0 | Status: DISCONTINUED | COMMUNITY
Start: 2022-09-09 | End: 2022-10-31

## 2022-10-31 RX ORDER — METHOCARBAMOL 750 MG/1
750 TABLET, FILM COATED ORAL
Qty: 90 | Refills: 0 | Status: ACTIVE | COMMUNITY
Start: 2022-06-30 | End: 1900-01-01

## 2022-10-31 NOTE — ASSESSMENT
[FreeTextEntry1] : Ms. OSEGUERA will continue with physical therapy. I have recommended neck & back strengthening exercises along with posture correction. I have renewed Robaxin for her to continue to take as needed for spasms. She will follow up with Dr. Botello for TPIs. I will see her back in 3 months with new cervical xrays. Will call barring any issues.\par \par \par

## 2022-10-31 NOTE — HISTORY OF PRESENT ILLNESS
[FreeTextEntry1] : Ms. OSEGUERA is s/p C5/6 C6/7 ACDF. Surgery date: 7/1/22. Preop radiculopathy has improved. She continues to feel trapezial pain / spasms. Denies dysphagia / dysphonia. Incision well healed. She is doing PT. She had 2 sessions of acupuncture. Taking Robaxin PRN spasms. Prior xrays cervical ([facility]) 8/3/22: good alignment / position of hardware.

## 2022-11-10 DIAGNOSIS — R76.8 OTHER SPECIFIED ABNORMAL IMMUNOLOGICAL FINDINGS IN SERUM: ICD-10-CM

## 2022-12-21 ENCOUNTER — OUTPATIENT (OUTPATIENT)
Dept: OUTPATIENT SERVICES | Facility: HOSPITAL | Age: 52
LOS: 1 days | Discharge: HOME | End: 2022-12-21

## 2022-12-21 DIAGNOSIS — Z98.890 OTHER SPECIFIED POSTPROCEDURAL STATES: Chronic | ICD-10-CM

## 2022-12-21 DIAGNOSIS — R90.89 OTHER ABNORMAL FINDINGS ON DIAGNOSTIC IMAGING OF CENTRAL NERVOUS SYSTEM: ICD-10-CM

## 2022-12-21 DIAGNOSIS — Z98.82 BREAST IMPLANT STATUS: Chronic | ICD-10-CM

## 2022-12-21 PROCEDURE — 70551 MRI BRAIN STEM W/O DYE: CPT | Mod: 26

## 2022-12-29 ENCOUNTER — APPOINTMENT (OUTPATIENT)
Dept: ENDOCRINOLOGY | Facility: CLINIC | Age: 52
End: 2022-12-29

## 2023-02-04 ENCOUNTER — RESULT REVIEW (OUTPATIENT)
Age: 53
End: 2023-02-04

## 2023-02-04 ENCOUNTER — OUTPATIENT (OUTPATIENT)
Dept: OUTPATIENT SERVICES | Facility: HOSPITAL | Age: 53
LOS: 1 days | End: 2023-02-04
Payer: COMMERCIAL

## 2023-02-04 DIAGNOSIS — M54.2 CERVICALGIA: ICD-10-CM

## 2023-02-04 DIAGNOSIS — Z98.890 OTHER SPECIFIED POSTPROCEDURAL STATES: Chronic | ICD-10-CM

## 2023-02-04 DIAGNOSIS — Z98.82 BREAST IMPLANT STATUS: Chronic | ICD-10-CM

## 2023-02-04 PROCEDURE — 72040 X-RAY EXAM NECK SPINE 2-3 VW: CPT | Mod: 26

## 2023-02-13 ENCOUNTER — APPOINTMENT (OUTPATIENT)
Dept: NEUROSURGERY | Facility: CLINIC | Age: 53
End: 2023-02-13
Payer: COMMERCIAL

## 2023-02-13 PROCEDURE — 99212 OFFICE O/P EST SF 10 MIN: CPT

## 2023-02-14 NOTE — ASSESSMENT
[FreeTextEntry1] : This is a 52-year-old female who presents for neurosurgical follow-up status post ACDF C5-7 which took place on July 1, 2022 with Dr. Salazar.  She continues to recover well after surgery with significant improvements noted in comparison to her preoperative complaints.  X-ray cervical spine reviewed with patient at this time, no evidence of hardware malfunction.  Patient is to return to the office as needed moving forward.\par \par Of note, she likely has a component of carpal tunnel syndrome involving the bilateral upper extremities for which we have reviewed treatment options and she will initiate nightly brace wearing.  She is aware that there is an option for carpal tunnel repair pending additional testing but she would not wish to pursue such efforts at this time.\par \par We will return to the office as needed moving forward.\par \par Trish Hutchins PA-C\par Claire Salazar MD

## 2023-02-14 NOTE — HISTORY OF PRESENT ILLNESS
[FreeTextEntry1] : This is a 52-year-old female who presents for neurosurgical revisit, she is status post C5-6, C6-7 ACDF which took place in July 2022.  She has to continue to report significant improvements postoperatively.  Reduction in isolated neck pain along with referred features into the upper extremities reported where she is no longer experience any radicular complaints.  At times, she can experience some isolated tension within the trapezius region for which she has undergone trigger point injections which have been very helpful.\par \par At night, she can experience numbness and tingling into her hands which is more consistent with a carpal tunnel like presentation.\par \par Cervical x-ray, [facility]- 2/2023-hardware appears to be in good alignment, reported lucency at the endplate of C5-6 is consistent with spacer device and is not a sign of impaired healing.\par \par PHYSICAL EXAM: \par \par Constitutional: Well appearing, no distress\par HEENT: Normocephalic Atraumatic\par Psychiatric: Alert and oriented to all spheres, normal mood\par Pulmonary: No respiratory distress\par \par Neurologic: \par CN II-XII grossly intact\par Palpation: no pain to palpation \par Strength: Full strength in all major muscle groups\par Sensation: Full sensation to light touch in all extremities\par Reflexes: \par  2+ biceps\par  2+ triceps\par (+) Phalen\par \par  No Betancourt's\par  No clonus\par \par ROM limited with extension\par \par Gait: steady, walking w/o assistance.\par

## 2023-03-03 ENCOUNTER — NON-APPOINTMENT (OUTPATIENT)
Age: 53
End: 2023-03-03

## 2023-05-26 ENCOUNTER — TRANSCRIPTION ENCOUNTER (OUTPATIENT)
Age: 53
End: 2023-05-26

## 2023-05-30 ENCOUNTER — APPOINTMENT (OUTPATIENT)
Dept: ENDOCRINOLOGY | Facility: CLINIC | Age: 53
End: 2023-05-30

## 2023-06-13 NOTE — ASU DISCHARGE PLAN (ADULT/PEDIATRIC) - ASU DC SPECIAL INSTRUCTIONSFT
Patient Name: SUKHJINDER OSEGUERA  MRN: [id number]  49y Female  [facility]    Post Operative Instructions  Please see wound care and activity instructions as documented above.    Pain medication prescribed:   traMADol 50 mg oral tablet: 1 tab(s) orally every 6 hours MDD:4 tablets  - Please take pain medications prescribed only as needed for severe pain, otherwise you may take Tylenol, 650mg every 6 hours as needed and/or Motrin, 600mg every 6 hours as needed for mild pain control    Additional Instructions:  Please use the peridex swish and swallow as instructed twice daily until the prescription is completed.  Please take decadron for 2 days as prescribed for swelling.   Please call the clinic and confirm your appointment for follow up, see the follow up instructions and the physician's office number  below. Please call the clinic for any questions or concerns.    07-31-20 @ 14:24
No

## 2023-06-24 ENCOUNTER — OUTPATIENT (OUTPATIENT)
Dept: OUTPATIENT SERVICES | Facility: HOSPITAL | Age: 53
LOS: 1 days | End: 2023-06-24
Payer: COMMERCIAL

## 2023-06-24 DIAGNOSIS — Z98.82 BREAST IMPLANT STATUS: Chronic | ICD-10-CM

## 2023-06-24 DIAGNOSIS — Z98.890 OTHER SPECIFIED POSTPROCEDURAL STATES: Chronic | ICD-10-CM

## 2023-06-24 DIAGNOSIS — R92.2 INCONCLUSIVE MAMMOGRAM: ICD-10-CM

## 2023-06-24 DIAGNOSIS — Z12.31 ENCOUNTER FOR SCREENING MAMMOGRAM FOR MALIGNANT NEOPLASM OF BREAST: ICD-10-CM

## 2023-06-24 PROCEDURE — 77067 SCR MAMMO BI INCL CAD: CPT | Mod: 26

## 2023-06-24 PROCEDURE — 76641 ULTRASOUND BREAST COMPLETE: CPT | Mod: 50

## 2023-06-24 PROCEDURE — 77063 BREAST TOMOSYNTHESIS BI: CPT

## 2023-06-24 PROCEDURE — 76641 ULTRASOUND BREAST COMPLETE: CPT | Mod: 26,50

## 2023-06-24 PROCEDURE — 77063 BREAST TOMOSYNTHESIS BI: CPT | Mod: 26

## 2023-06-24 PROCEDURE — 77067 SCR MAMMO BI INCL CAD: CPT

## 2023-06-25 DIAGNOSIS — Z12.31 ENCOUNTER FOR SCREENING MAMMOGRAM FOR MALIGNANT NEOPLASM OF BREAST: ICD-10-CM

## 2023-06-25 DIAGNOSIS — R92.2 INCONCLUSIVE MAMMOGRAM: ICD-10-CM

## 2023-07-03 ENCOUNTER — APPOINTMENT (OUTPATIENT)
Dept: NEUROSURGERY | Facility: CLINIC | Age: 53
End: 2023-07-03
Payer: COMMERCIAL

## 2023-07-03 VITALS — BODY MASS INDEX: 25.61 KG/M2 | WEIGHT: 150 LBS | HEIGHT: 64 IN

## 2023-07-03 DIAGNOSIS — M54.2 CERVICALGIA: ICD-10-CM

## 2023-07-03 PROCEDURE — 99213 OFFICE O/P EST LOW 20 MIN: CPT

## 2023-07-03 NOTE — ASSESSMENT
[FreeTextEntry1] : Ms. OSEGUERA will proceed with a CT scan of the cervical spine to assess her fusion.  Pending the CT results she may consider cervical MBB/RFA versus continued trigger point injections.  We have also discussed the importance of back strengthening exercises and posture correction.  PT may also be reconsidered after CT results are discussed with her.  She will schedule a follow-up once her CT is scheduled.  All questions answered today.\par \par \par Gardenia Valenzuela, MS MARINA\par Claire Salazar MD \par \par \par

## 2023-07-03 NOTE — HISTORY OF PRESENT ILLNESS
[FreeTextEntry1] : Ms. OSEGUERA is status post C5-6, C6-7 ACDF.  This took place in July 2022. Since surgery her preoperative neck pain and radiculopathy has improved.  She continues to experience trapezial spasms.  Trigger point injections provide temporary relief.  She is also intermittently symptomatic in regards to her known carpal tunnel syndrome.  She reports nocturnal numbness and paresthesias in her hands which does not happen every night.  She has not been using wrist splints.  No recent physical therapy for trapezial spasms.\par \par Her prior Cervical x-ray, VZ- 2/2023 was reviewed today.  Good alignment and position of her cervical hardware.\par

## 2023-07-03 NOTE — PHYSICAL EXAM
[FreeTextEntry1] : Constitutional: Well appearing, no distress\par HEENT: Normocephalic Atraumatic\par Psychiatric: Alert and oriented to all spheres, normal mood\par Pulmonary: No respiratory distress\par Neurologic: \par CN II-XII grossly intact\par Palpation: no pain to palpation \par Strength: Full strength in all major muscle groups\par Sensation: Full sensation to light touch in all extremities\par Reflexes: \par  2+ biceps\par  2+ triceps\par (+) Phalen\par  No Betancourt's\par  No clonus\par ROM limited with extension\par Gait: steady, walking w/o assistance.\par Incision: Well-healed.

## 2023-09-25 ENCOUNTER — APPOINTMENT (OUTPATIENT)
Dept: ENDOCRINOLOGY | Facility: CLINIC | Age: 53
End: 2023-09-25

## 2024-03-25 ENCOUNTER — APPOINTMENT (OUTPATIENT)
Dept: NEUROSURGERY | Facility: CLINIC | Age: 54
End: 2024-03-25
Payer: COMMERCIAL

## 2024-03-25 DIAGNOSIS — G62.9 POLYNEUROPATHY, UNSPECIFIED: ICD-10-CM

## 2024-03-25 PROCEDURE — 99211 OFF/OP EST MAY X REQ PHY/QHP: CPT

## 2024-03-25 NOTE — ASSESSMENT
[FreeTextEntry1] : This is a 53-year-old female who presents for neurosurgical revisit.  She has a pertinent past surgical history which includes a C5-6, C6-7 ACDF which took place in July 2022.  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  I have suggested that she undergo an EMG to the bilateral upper and lower extremities.  I have also suggested that she undergo a rheumatologic consult considering her multisite pain complaints and a strong family history of autoimmunity's including-polymyalgia rheumatica, scleroderma, multiple sclerosis (mother and sisters).   She can undergo a TTM once her EMG has been completed and can return to the office as needed after that time.  I personally reviewed with the PA, this patient's history and physical exam findings, as documented above. I have discussed the relevant areas of concern, having direct implications to the presenting problems and illnesses, and I have personally examined all pertinent and positive and negative findings, which impact the neurosurgical treatment plan.  SALAS Diana MD

## 2024-03-25 NOTE — HISTORY OF PRESENT ILLNESS
[FreeTextEntry1] : This is a 52 yo F who presents for neurosurgical revisit, she has a past surgical history of prior C5-6, C6-7 ACDF which took place in July 2022 with Dr. Salazar.  Postoperatively, she has noted a significant improvement with regards to her lumbar radicular complaints and remains mostly satisfied with her response.  Over the previous 1 to 2 months she has begun to experience pain involving the right trapezius region, right elbow, with squeezing pain into the right wrist and shooting discomfort into all 5 fingers of the right hand.  She denies an increase in neck pain and denies any clear radicular component to such symptoms.  With regards to her lower extremities, she reports at night she has been experiencing shooting pain from her hips into the entirety of her legs which awakens her from sleep.  She had undergone a right knee surgery but this did not alleviate such complaint.  She has not expressed this concern to her primary care doctor and has not sought rheumatology input.  No updated testing for my review. Prior CT [facility] 8/2023-hardware is properly aligned, no evidence of adjacent segment pathology and mild spondylitic change appears stable in comparison to prior films.  PHYSICAL EXAM:  Constitutional: Well appearing, no distress HEENT: Normocephalic Atraumatic Psychiatric: Alert and oriented to all spheres, normal mood Pulmonary: No respiratory distress  Neurologic:  CN II-XII grossly intact Palpation: Tenderness to palpation over right wrist Strength: Full strength in all major muscle groups Sensation: Full sensation to light touch in all extremities Reflexes:   2+ biceps  2+ triceps   No Betancourt's  No clonus  ROM intact  Gait: steady, walking w/o assistance.

## 2024-04-20 ENCOUNTER — APPOINTMENT (OUTPATIENT)
Dept: NEUROSURGERY | Facility: CLINIC | Age: 54
End: 2024-04-20
Payer: COMMERCIAL

## 2024-04-20 PROCEDURE — 99441: CPT

## 2024-04-22 NOTE — REASON FOR VISIT
[FreeTextEntry1] : s/p C5-6, 6-7 ACDF did well until a few months ago; reports neck pain radiating to her right arm  EMG reviewed with patient consistent with right C6/7 radiculopathy and bilateral cTS  Patient failed at least 6 weeks of physical therapy.  I recommend an MRI cervical spine  she will return once this is done.

## 2024-05-18 ENCOUNTER — OUTPATIENT (OUTPATIENT)
Dept: OUTPATIENT SERVICES | Facility: HOSPITAL | Age: 54
LOS: 1 days | End: 2024-05-18
Payer: COMMERCIAL

## 2024-05-18 DIAGNOSIS — Z98.82 BREAST IMPLANT STATUS: Chronic | ICD-10-CM

## 2024-05-18 DIAGNOSIS — R92.2 INCONCLUSIVE MAMMOGRAM: ICD-10-CM

## 2024-05-18 DIAGNOSIS — Z98.890 OTHER SPECIFIED POSTPROCEDURAL STATES: Chronic | ICD-10-CM

## 2024-05-18 DIAGNOSIS — Z12.31 ENCOUNTER FOR SCREENING MAMMOGRAM FOR MALIGNANT NEOPLASM OF BREAST: ICD-10-CM

## 2024-05-18 DIAGNOSIS — R92.8 OTHER ABNORMAL AND INCONCLUSIVE FINDINGS ON DIAGNOSTIC IMAGING OF BREAST: ICD-10-CM

## 2024-05-18 PROCEDURE — 77065 DX MAMMO INCL CAD UNI: CPT | Mod: LT

## 2024-05-18 PROCEDURE — 76642 ULTRASOUND BREAST LIMITED: CPT | Mod: LT

## 2024-05-18 PROCEDURE — 77065 DX MAMMO INCL CAD UNI: CPT | Mod: 26,LT

## 2024-05-18 PROCEDURE — G0279: CPT | Mod: 26

## 2024-05-18 PROCEDURE — 76642 ULTRASOUND BREAST LIMITED: CPT | Mod: 26,LT

## 2024-05-18 PROCEDURE — G0279: CPT

## 2024-05-19 DIAGNOSIS — R92.8 OTHER ABNORMAL AND INCONCLUSIVE FINDINGS ON DIAGNOSTIC IMAGING OF BREAST: ICD-10-CM

## 2024-05-31 ENCOUNTER — OUTPATIENT (OUTPATIENT)
Dept: OUTPATIENT SERVICES | Facility: HOSPITAL | Age: 54
LOS: 1 days | End: 2024-05-31
Payer: COMMERCIAL

## 2024-05-31 ENCOUNTER — RESULT REVIEW (OUTPATIENT)
Age: 54
End: 2024-05-31

## 2024-05-31 DIAGNOSIS — Z00.8 ENCOUNTER FOR OTHER GENERAL EXAMINATION: ICD-10-CM

## 2024-05-31 DIAGNOSIS — M47.22 OTHER SPONDYLOSIS WITH RADICULOPATHY, CERVICAL REGION: ICD-10-CM

## 2024-05-31 DIAGNOSIS — Z98.890 OTHER SPECIFIED POSTPROCEDURAL STATES: Chronic | ICD-10-CM

## 2024-05-31 DIAGNOSIS — Z98.82 BREAST IMPLANT STATUS: Chronic | ICD-10-CM

## 2024-05-31 PROCEDURE — 72141 MRI NECK SPINE W/O DYE: CPT | Mod: 26

## 2024-05-31 PROCEDURE — 72141 MRI NECK SPINE W/O DYE: CPT

## 2024-06-01 DIAGNOSIS — M47.22 OTHER SPONDYLOSIS WITH RADICULOPATHY, CERVICAL REGION: ICD-10-CM

## 2024-06-24 ENCOUNTER — APPOINTMENT (OUTPATIENT)
Dept: NEUROSURGERY | Facility: CLINIC | Age: 54
End: 2024-06-24
Payer: COMMERCIAL

## 2024-06-24 DIAGNOSIS — G56.03 CARPAL TUNNEL SYNDROM,BILATERAL UPPER LIMBS: ICD-10-CM

## 2024-06-24 DIAGNOSIS — M54.16 RADICULOPATHY, LUMBAR REGION: ICD-10-CM

## 2024-06-24 DIAGNOSIS — M47.22 OTHER SPONDYLOSIS WITH RADICULOPATHY, CERVICAL REGION: ICD-10-CM

## 2024-06-24 PROCEDURE — 99441: CPT

## 2024-06-24 PROCEDURE — 99213 OFFICE O/P EST LOW 20 MIN: CPT

## 2024-06-27 PROBLEM — M54.16 ACUTE LUMBAR RADICULOPATHY: Status: ACTIVE | Noted: 2024-06-24

## 2024-06-27 PROBLEM — G56.03 BILATERAL CARPAL TUNNEL SYNDROME: Status: ACTIVE | Noted: 2024-06-27

## 2024-06-27 PROBLEM — M47.22 CERVICAL SPONDYLOSIS WITH RADICULOPATHY: Status: ACTIVE | Noted: 2022-05-12

## 2024-06-29 ENCOUNTER — OUTPATIENT (OUTPATIENT)
Dept: OUTPATIENT SERVICES | Facility: HOSPITAL | Age: 54
LOS: 1 days | End: 2024-06-29
Payer: COMMERCIAL

## 2024-06-29 DIAGNOSIS — Z12.31 ENCOUNTER FOR SCREENING MAMMOGRAM FOR MALIGNANT NEOPLASM OF BREAST: ICD-10-CM

## 2024-06-29 DIAGNOSIS — Z98.890 OTHER SPECIFIED POSTPROCEDURAL STATES: Chronic | ICD-10-CM

## 2024-06-29 DIAGNOSIS — Z98.82 BREAST IMPLANT STATUS: Chronic | ICD-10-CM

## 2024-06-29 PROCEDURE — 77067 SCR MAMMO BI INCL CAD: CPT | Mod: 26

## 2024-06-29 PROCEDURE — 77063 BREAST TOMOSYNTHESIS BI: CPT | Mod: 26

## 2024-06-29 PROCEDURE — 77067 SCR MAMMO BI INCL CAD: CPT

## 2024-06-29 PROCEDURE — 77063 BREAST TOMOSYNTHESIS BI: CPT

## 2024-06-30 DIAGNOSIS — Z12.31 ENCOUNTER FOR SCREENING MAMMOGRAM FOR MALIGNANT NEOPLASM OF BREAST: ICD-10-CM

## 2024-08-16 ENCOUNTER — APPOINTMENT (OUTPATIENT)
Dept: PAIN MANAGEMENT | Facility: CLINIC | Age: 54
End: 2024-08-16

## 2024-09-23 ENCOUNTER — APPOINTMENT (OUTPATIENT)
Dept: NEUROSURGERY | Facility: CLINIC | Age: 54
End: 2024-09-23

## 2024-11-12 NOTE — REASON FOR VISIT
Render Risk Assessment In Note?: no [Subsequent Evaluation] : a subsequent evaluation for [FreeTextEntry2] : ranula of floor of mouth, clogged ears

## 2025-01-06 NOTE — H&P PST ADULT - TEMPERATURE IN CELSIUS (DEGREES C)
----- Message from Mika MOSS sent at 1/2/2025  4:02 PM EST -----  Regarding: ECC Escalation To Practice  ECC Escalation To Practice      Type of Escalation: Red Flag Symptom  --------------------------------------------------------------------------------------------------------------------------    Information for Provider:  Patient is looking for appointment for: Symptom Difficulty of breathing  Reasons for Message: Patient disconnected     Additional Information The patient experiencing shortness of breath and disconnected the call  --------------------------------------------------------------------------------------------------------------------------    Relationship to Patient: Self     Call Back Info: OK to leave message on voicemail  Preferred Call Back Number: Phone [phone]  
Called pt and acute appt was made  
36.6

## 2025-03-03 ENCOUNTER — APPOINTMENT (OUTPATIENT)
Dept: NEUROSURGERY | Facility: CLINIC | Age: 55
End: 2025-03-03
Payer: COMMERCIAL

## 2025-03-03 ENCOUNTER — NON-APPOINTMENT (OUTPATIENT)
Age: 55
End: 2025-03-03

## 2025-03-03 VITALS — WEIGHT: 142 LBS | BODY MASS INDEX: 24.24 KG/M2 | HEIGHT: 64 IN

## 2025-03-03 DIAGNOSIS — M48.061 SPINAL STENOSIS, LUMBAR REGION WITHOUT NEUROGENIC CLAUDICATION: ICD-10-CM

## 2025-03-03 DIAGNOSIS — G56.03 CARPAL TUNNEL SYNDROM,BILATERAL UPPER LIMBS: ICD-10-CM

## 2025-03-03 DIAGNOSIS — M47.22 OTHER SPONDYLOSIS WITH RADICULOPATHY, CERVICAL REGION: ICD-10-CM

## 2025-03-03 PROCEDURE — 99214 OFFICE O/P EST MOD 30 MIN: CPT

## 2025-04-21 ENCOUNTER — APPOINTMENT (OUTPATIENT)
Dept: NEUROSURGERY | Facility: CLINIC | Age: 55
End: 2025-04-21

## 2025-05-12 ENCOUNTER — NON-APPOINTMENT (OUTPATIENT)
Age: 55
End: 2025-05-12

## 2025-05-12 ENCOUNTER — APPOINTMENT (OUTPATIENT)
Dept: NEUROSURGERY | Facility: CLINIC | Age: 55
End: 2025-05-12
Payer: COMMERCIAL

## 2025-05-12 VITALS — HEIGHT: 64 IN | WEIGHT: 142 LBS | BODY MASS INDEX: 24.24 KG/M2

## 2025-05-12 DIAGNOSIS — M54.16 RADICULOPATHY, LUMBAR REGION: ICD-10-CM

## 2025-05-12 PROCEDURE — 99214 OFFICE O/P EST MOD 30 MIN: CPT

## 2025-05-13 ENCOUNTER — NON-APPOINTMENT (OUTPATIENT)
Age: 55
End: 2025-05-13

## 2025-05-22 ENCOUNTER — NON-APPOINTMENT (OUTPATIENT)
Age: 55
End: 2025-05-22

## 2025-05-30 ENCOUNTER — OUTPATIENT (OUTPATIENT)
Dept: OUTPATIENT SERVICES | Facility: HOSPITAL | Age: 55
LOS: 1 days | End: 2025-05-30
Payer: COMMERCIAL

## 2025-05-30 VITALS
TEMPERATURE: 98 F | DIASTOLIC BLOOD PRESSURE: 75 MMHG | HEIGHT: 64 IN | RESPIRATION RATE: 16 BRPM | WEIGHT: 141.98 LBS | SYSTOLIC BLOOD PRESSURE: 118 MMHG | HEART RATE: 70 BPM | OXYGEN SATURATION: 100 %

## 2025-05-30 DIAGNOSIS — Z98.1 ARTHRODESIS STATUS: Chronic | ICD-10-CM

## 2025-05-30 DIAGNOSIS — M54.16 RADICULOPATHY, LUMBAR REGION: ICD-10-CM

## 2025-05-30 DIAGNOSIS — Z98.890 OTHER SPECIFIED POSTPROCEDURAL STATES: Chronic | ICD-10-CM

## 2025-05-30 DIAGNOSIS — Z01.818 ENCOUNTER FOR OTHER PREPROCEDURAL EXAMINATION: ICD-10-CM

## 2025-05-30 DIAGNOSIS — Z98.82 BREAST IMPLANT STATUS: Chronic | ICD-10-CM

## 2025-05-30 LAB
A1C WITH ESTIMATED AVERAGE GLUCOSE RESULT: 5.1 % — SIGNIFICANT CHANGE UP (ref 4–5.6)
ALBUMIN SERPL ELPH-MCNC: 4.7 G/DL — SIGNIFICANT CHANGE UP (ref 3.5–5.2)
ALP SERPL-CCNC: 69 U/L — SIGNIFICANT CHANGE UP (ref 30–115)
ALT FLD-CCNC: 19 U/L — SIGNIFICANT CHANGE UP (ref 0–41)
ANION GAP SERPL CALC-SCNC: 11 MMOL/L — SIGNIFICANT CHANGE UP (ref 7–14)
APTT BLD: 30.3 SEC — SIGNIFICANT CHANGE UP (ref 27–39.2)
AST SERPL-CCNC: 22 U/L — SIGNIFICANT CHANGE UP (ref 0–41)
BASOPHILS # BLD AUTO: 0.03 K/UL — SIGNIFICANT CHANGE UP (ref 0–0.2)
BASOPHILS NFR BLD AUTO: 0.7 % — SIGNIFICANT CHANGE UP (ref 0–1)
BILIRUB SERPL-MCNC: 0.4 MG/DL — SIGNIFICANT CHANGE UP (ref 0.2–1.2)
BLD GP AB SCN SERPL QL: SIGNIFICANT CHANGE UP
BUN SERPL-MCNC: 17 MG/DL — SIGNIFICANT CHANGE UP (ref 10–20)
CALCIUM SERPL-MCNC: 9.7 MG/DL — SIGNIFICANT CHANGE UP (ref 8.4–10.5)
CHLORIDE SERPL-SCNC: 102 MMOL/L — SIGNIFICANT CHANGE UP (ref 98–110)
CO2 SERPL-SCNC: 27 MMOL/L — SIGNIFICANT CHANGE UP (ref 17–32)
CREAT SERPL-MCNC: 0.6 MG/DL — LOW (ref 0.7–1.5)
EGFR: 105 ML/MIN/1.73M2 — SIGNIFICANT CHANGE UP
EGFR: 105 ML/MIN/1.73M2 — SIGNIFICANT CHANGE UP
EOSINOPHIL # BLD AUTO: 0.18 K/UL — SIGNIFICANT CHANGE UP (ref 0–0.7)
EOSINOPHIL NFR BLD AUTO: 4.1 % — SIGNIFICANT CHANGE UP (ref 0–8)
ESTIMATED AVERAGE GLUCOSE: 100 MG/DL — SIGNIFICANT CHANGE UP (ref 68–114)
GLUCOSE SERPL-MCNC: 60 MG/DL — LOW (ref 70–99)
HCT VFR BLD CALC: 39 % — SIGNIFICANT CHANGE UP (ref 37–47)
HGB BLD-MCNC: 12.8 G/DL — SIGNIFICANT CHANGE UP (ref 12–16)
IMM GRANULOCYTES NFR BLD AUTO: 0.2 % — SIGNIFICANT CHANGE UP (ref 0.1–0.3)
INR BLD: 0.92 RATIO — SIGNIFICANT CHANGE UP (ref 0.65–1.3)
LYMPHOCYTES # BLD AUTO: 1.56 K/UL — SIGNIFICANT CHANGE UP (ref 1.2–3.4)
LYMPHOCYTES # BLD AUTO: 35.9 % — SIGNIFICANT CHANGE UP (ref 20.5–51.1)
MCHC RBC-ENTMCNC: 29.6 PG — SIGNIFICANT CHANGE UP (ref 27–31)
MCHC RBC-ENTMCNC: 32.8 G/DL — SIGNIFICANT CHANGE UP (ref 32–37)
MCV RBC AUTO: 90.3 FL — SIGNIFICANT CHANGE UP (ref 81–99)
MONOCYTES # BLD AUTO: 0.41 K/UL — SIGNIFICANT CHANGE UP (ref 0.1–0.6)
MONOCYTES NFR BLD AUTO: 9.4 % — HIGH (ref 1.7–9.3)
MRSA PCR RESULT.: NEGATIVE — SIGNIFICANT CHANGE UP
NEUTROPHILS # BLD AUTO: 2.15 K/UL — SIGNIFICANT CHANGE UP (ref 1.4–6.5)
NEUTROPHILS NFR BLD AUTO: 49.7 % — SIGNIFICANT CHANGE UP (ref 42.2–75.2)
NRBC BLD AUTO-RTO: 0 /100 WBCS — SIGNIFICANT CHANGE UP (ref 0–0)
PLATELET # BLD AUTO: 343 K/UL — SIGNIFICANT CHANGE UP (ref 130–400)
PMV BLD: 10.1 FL — SIGNIFICANT CHANGE UP (ref 7.4–10.4)
POTASSIUM SERPL-MCNC: 4.3 MMOL/L — SIGNIFICANT CHANGE UP (ref 3.5–5)
POTASSIUM SERPL-SCNC: 4.3 MMOL/L — SIGNIFICANT CHANGE UP (ref 3.5–5)
PROT SERPL-MCNC: 7.4 G/DL — SIGNIFICANT CHANGE UP (ref 6–8)
PROTHROM AB SERPL-ACNC: 10.8 SEC — SIGNIFICANT CHANGE UP (ref 9.95–12.87)
RBC # BLD: 4.32 M/UL — SIGNIFICANT CHANGE UP (ref 4.2–5.4)
RBC # FLD: 13.3 % — SIGNIFICANT CHANGE UP (ref 11.5–14.5)
SODIUM SERPL-SCNC: 140 MMOL/L — SIGNIFICANT CHANGE UP (ref 135–146)
WBC # BLD: 4.34 K/UL — LOW (ref 4.8–10.8)
WBC # FLD AUTO: 4.34 K/UL — LOW (ref 4.8–10.8)

## 2025-05-30 PROCEDURE — 86900 BLOOD TYPING SEROLOGIC ABO: CPT

## 2025-05-30 PROCEDURE — 83036 HEMOGLOBIN GLYCOSYLATED A1C: CPT

## 2025-05-30 PROCEDURE — 99214 OFFICE O/P EST MOD 30 MIN: CPT | Mod: 25

## 2025-05-30 PROCEDURE — 93010 ELECTROCARDIOGRAM REPORT: CPT

## 2025-05-30 PROCEDURE — 85025 COMPLETE CBC W/AUTO DIFF WBC: CPT

## 2025-05-30 PROCEDURE — 86901 BLOOD TYPING SEROLOGIC RH(D): CPT

## 2025-05-30 PROCEDURE — 80053 COMPREHEN METABOLIC PANEL: CPT

## 2025-05-30 PROCEDURE — 93005 ELECTROCARDIOGRAM TRACING: CPT

## 2025-05-30 PROCEDURE — 36415 COLL VENOUS BLD VENIPUNCTURE: CPT

## 2025-05-30 PROCEDURE — 86850 RBC ANTIBODY SCREEN: CPT

## 2025-05-30 PROCEDURE — 87640 STAPH A DNA AMP PROBE: CPT

## 2025-05-30 PROCEDURE — 85610 PROTHROMBIN TIME: CPT

## 2025-05-30 PROCEDURE — 85730 THROMBOPLASTIN TIME PARTIAL: CPT

## 2025-05-30 PROCEDURE — 87641 MR-STAPH DNA AMP PROBE: CPT

## 2025-05-30 RX ORDER — LEVOTHYROXINE SODIUM 300 MCG
1 TABLET ORAL
Refills: 0 | DISCHARGE

## 2025-05-30 RX ORDER — TIRZEPATIDE 7.5 MG/.5ML
15 INJECTION, SOLUTION SUBCUTANEOUS
Refills: 0 | DISCHARGE

## 2025-05-30 RX ORDER — ROSUVASTATIN CALCIUM 20 MG/1
1 TABLET, FILM COATED ORAL
Refills: 0 | DISCHARGE

## 2025-05-30 NOTE — H&P PST ADULT - NSICDXFAMILYHX_GEN_ALL_CORE_FT
FAMILY HISTORY:  Family history of thalassemia, father and sister    Father  Still living? Yes, Estimated age: Age Unknown  FH: arrhythmia, Age at diagnosis: Age Unknown  FH: prostate cancer, Age at diagnosis: Age Unknown

## 2025-05-30 NOTE — H&P PST ADULT - MUSCULOSKELETAL
normal/ROM intact/normal gait/strength 5/5 bilateral upper extremities/strength 5/5 bilateral lower extremities Negative ROM intact/normal gait/strength 5/5 bilateral upper extremities/strength 5/5 bilateral lower extremities/back exam

## 2025-05-30 NOTE — H&P PST ADULT - HISTORY OF COVID-19 VACCINATION
----- Message from Solomon Gil MD sent at 4/13/2023  2:36 PM CDT -----  Richmond Gregory,    I spoke with Nikunj earlier today to review results of his MRI and request blood work. I forgot to tell him that I would be ordering neuropsychology testing as well to see if his pattern of memory troubles fits with a history of Wernicke's encephalopathy.     Do you mind making sure that the neuropsych testing gets scheduled? Would be great if he could have it done some time before his follow up in September with Dr. Kinney, but I know they are booking far out. Maybe he is willing to go to a site outsite of [place] (Ex. [place]) to get it done?     Solomon       Yes

## 2025-05-30 NOTE — H&P PST ADULT - NSICDXPASTSURGICALHX_GEN_ALL_CORE_FT
PAST SURGICAL HISTORY:  H/O breast augmentation     H/O sinus surgery     S/P cervical spinal fusion

## 2025-05-30 NOTE — H&P PST ADULT - REASON FOR ADMISSION
Case Type: OP  Suite: [facility]  Proceduralist: Claire Salazar  Confirmed Surgery Date Time: 06-   PAST Date Time: 05- - 7:30  Procedure: RIGHT Lumbar 5- SACRAL1 laminoforaminotomy  Laterality: Spinal  Length of Procedure: 60 Minutes  Anesthesia Type: General

## 2025-05-30 NOTE — H&P PST ADULT - HISTORY OF PRESENT ILLNESS
Patient is a 55 yo female with PMH of Hypothyroidism, HLD, Obesity on Mounjaro (lost 20 lbs weight x 1 yr), Chronic back and neck pains s/p cervical C4-5, 5-6 anterior cervical discectomy and fusion 01/1/2022 who presents to PAST for the above surgery due to sharp low back pains radiating to RLE with numbness and tinglings x 2 yrs and partial reliefs with epidural injections, PT and medications.  MRI L spine shown multilevel disc bulges   Patient denies any cp, sob, palpitations, fever, cough, URI, abdominal pains, N/V, UTI, Rashes or open wounds.  As per patient exercise tolerance of 1-2 fos walks with out sob  Today patient denies any signs and symptoms or exposure to covid.  Anesthesia Alert  NO--Difficult Airway  NO--History of neck surgery or radiation  NO--Limited ROM of neck  NO--History of Malignant hyperthermia  NO--Personal or family history of Pseudocholinesterase deficiency  NO--Prior Anesthesia Complication  NO--Latex Allergy  NO--Loose teeth  NO--History of Rheumatoid Arthritis  NO--CARROLL  NO--Risk of Bleedings  Pt instructed to stop vitamins/supplements/herbal medications for one week prior to surgery  Endorses this is their full medical & surgical history including medications prescribed. Pt verbalized understanding of all pre-operative instructions and was given the opportunity to ask questions and have them answered. They were instructed to follow up with their surgeon/proceduralists office with any additional questions regarding their procedure.  Duke Activity Status Index (DASI) from [employer]  on 5/30/2025  ** All calculations should be rechecked by clinician prior to use **  RESULT SUMMARY:  42.7 points  The higher the score (maximum 58.2), the higher the functional status.  7.99 METs  INPUTS:  Take care of self —> 2.75 = Yes  Walk indoors —> 1.75 = Yes  Walk 1&ndash;2 blocks on level ground —> 2.75 = Yes  Climb a flight of stairs or walk up a hill —> 5.5 = Yes  Run a short distance —> 8 = Yes  Do light work around the house —> 2.7 = Yes  Do moderate work around the house —> 3.5 = Yes  Do heavy work around the house —> 0 = No  Do yard work —> 4.5 = Yes  Have sexual relations —> 5.25 = Yes  Participate in moderate recreational activities —> 6 = Yes  Participate in strenuous sports —> 0 = No  Revised Cardiac Risk Index for Pre-Operative Risk from [employer]  on 5/30/2025  ** All calculations should be rechecked by clinician prior to use **  RESULT SUMMARY:  1 points  RCRI Score  6.0 %  Risk of major cardiac event  INPUTS:  High-risk surgery —> 1 = Yes  History of ischemic heart disease —> 0 = No  History of congestive heart failure —> 0 = No  History of cerebrovascular disease —> 0 = No  Pre-operative treatment with insulin —> 0 = No  Pre-operative creatinine >2 mg/dL / 176.8 µmol/L —> 0 = No  Opioid Risk Assessment Tool (Female)       Family history of substance abuse            Alcohol (1)            Illegal Drugs (2)            Prescription drugs (4)       Personal history of substance abuse            Alcohol (3)            Illegal Drugs (4)            Prescription drugs (5)       Age between 16-45 (1)       History of preadolescent sexual abuse (3)       Psychological disease (ADD, ADHD, OCD, Bipolar Disorder, Schizophrenia, Depression) (2)    Scoring Totals:  Low Risk (0-3)  Moderate Risk (4-7)  High Risk (>/=8)  The patient confirms they have received, reviewed, and understood their preoperative spine education material.  In the event of any questions or concerns leading up to the surgery, the patient is aware of how to contact the surgeon's office or the [facility] SPINE PROGRAM.       Patient is a 53 yo female with PMH of Hypothyroidism, HLD, Obesity on Mounjaro (lost 20 lbs weight x 1 yr), Chronic back and neck pains s/p cervical C4-5, 5-6 anterior cervical discectomy and fusion 01/1/2022 who presents to PAST for the above surgery due to sharp low back pains radiating to RLE with numbness and tinglings x 2 yrs and partial reliefs with epidural injections, PT and medications.  MRI L spine shown multilevel disc bulges most significant L5 S1 with moderate foraminal stenosis greater on the right.   Patient denies any cp, sob, palpitations, fever, cough, URI, abdominal pains, N/V, UTI, Rashes or open wounds.  As per patient exercise tolerance of 1-2 fos walks with out sob  Today patient denies any signs and symptoms or exposure to covid.  Anesthesia Alert  NO--Difficult Airway  NO--History of neck surgery or radiation  NO--Limited ROM of neck  NO--History of Malignant hyperthermia  NO--Personal or family history of Pseudocholinesterase deficiency  NO--Prior Anesthesia Complication  NO--Latex Allergy  NO--Loose teeth  NO--History of Rheumatoid Arthritis  NO--CARROLL  NO--Risk of Bleedings  B/L breast augmentation   Pt instructed to stop vitamins/supplements/herbal medications for one week prior to surgery  Endorses this is their full medical & surgical history including medications prescribed. Pt verbalized understanding of all pre-operative instructions and was given the opportunity to ask questions and have them answered. They were instructed to follow up with their surgeon/proceduralists office with any additional questions regarding their procedure.  Duke Activity Status Index (DASI) from [email or web address]  on 5/30/2025  ** All calculations should be rechecked by clinician prior to use **  RESULT SUMMARY:  42.7 points  The higher the score (maximum 58.2), the higher the functional status.  7.99 METs  INPUTS:  Take care of self —> 2.75 = Yes  Walk indoors —> 1.75 = Yes  Walk 1&ndash;2 blocks on level ground —> 2.75 = Yes  Climb a flight of stairs or walk up a hill —> 5.5 = Yes  Run a short distance —> 8 = Yes  Do light work around the house —> 2.7 = Yes  Do moderate work around the house —> 3.5 = Yes  Do heavy work around the house —> 0 = No  Do yard work —> 4.5 = Yes  Have sexual relations —> 5.25 = Yes  Participate in moderate recreational activities —> 6 = Yes  Participate in strenuous sports —> 0 = No  Revised Cardiac Risk Index for Pre-Operative Risk from [employer]  on 5/30/2025  ** All calculations should be rechecked by clinician prior to use **  RESULT SUMMARY:  1 points  RCRI Score  6.0 %  Risk of major cardiac event  INPUTS:  High-risk surgery —> 1 = Yes  History of ischemic heart disease —> 0 = No  History of congestive heart failure —> 0 = No  History of cerebrovascular disease —> 0 = No  Pre-operative treatment with insulin —> 0 = No  Pre-operative creatinine >2 mg/dL / 176.8 µmol/L —> 0 = No  Opioid Risk Assessment Tool (Female)       Family history of substance abuse            Alcohol (1)            Illegal Drugs (2)            Prescription drugs (4)       Personal history of substance abuse            Alcohol (3)            Illegal Drugs (4)            Prescription drugs (5)       Age between 16-45 (1)       History of preadolescent sexual abuse (3)       Psychological disease (ADD, ADHD, OCD, Bipolar Disorder, Schizophrenia, Depression) (2)    Scoring Totals:  Low Risk (0-3)  Moderate Risk (4-7)  High Risk (>/=8)  The patient confirms they have received, reviewed, and understood their preoperative spine education material.  In the event of any questions or concerns leading up to the surgery, the patient is aware of how to contact the surgeon's office or the [facility] SPINE PROGRAM.

## 2025-05-30 NOTE — H&P PST ADULT - NSICDXPASTMEDICALHX_GEN_ALL_CORE_FT
PAST MEDICAL HISTORY:  Anxiety     Back pain     Cervical spine pain     Has immunity to COVID-19 virus     Hyperlipidemia     Hypothyroidism     IBS (irritable bowel syndrome)     Lumbar radiculopathy

## 2025-05-31 DIAGNOSIS — M54.16 RADICULOPATHY, LUMBAR REGION: ICD-10-CM

## 2025-05-31 DIAGNOSIS — Z01.818 ENCOUNTER FOR OTHER PREPROCEDURAL EXAMINATION: ICD-10-CM

## 2025-06-12 ENCOUNTER — NON-APPOINTMENT (OUTPATIENT)
Age: 55
End: 2025-06-12

## 2025-06-25 NOTE — ASU PATIENT PROFILE, ADULT - PATIENT KNOW
Provider Recommendation Follow Up:   Reached patient/caregiver. Informed of provider's recommendations. Patient verbalized understanding and agrees with the plan. Pt scheduled for appt on 12/02/2024 per pt request. Pt verbalized understanding to go to urgent care if symptoms worsen prior to appt.    Mecca Prince RN   yes

## 2025-06-26 ENCOUNTER — TRANSCRIPTION ENCOUNTER (OUTPATIENT)
Age: 55
End: 2025-06-26

## 2025-06-26 ENCOUNTER — OUTPATIENT (OUTPATIENT)
Dept: OUTPATIENT SERVICES | Facility: HOSPITAL | Age: 55
LOS: 1 days | Discharge: HOME | End: 2025-06-26
Payer: COMMERCIAL

## 2025-06-26 ENCOUNTER — APPOINTMENT (OUTPATIENT)
Dept: NEUROSURGERY | Facility: CLINIC | Age: 55
End: 2025-06-26

## 2025-06-26 VITALS
DIASTOLIC BLOOD PRESSURE: 71 MMHG | WEIGHT: 139.99 LBS | OXYGEN SATURATION: 99 % | HEIGHT: 64 IN | TEMPERATURE: 98 F | SYSTOLIC BLOOD PRESSURE: 114 MMHG | HEART RATE: 74 BPM

## 2025-06-26 VITALS
DIASTOLIC BLOOD PRESSURE: 77 MMHG | HEART RATE: 71 BPM | RESPIRATION RATE: 15 BRPM | SYSTOLIC BLOOD PRESSURE: 110 MMHG | OXYGEN SATURATION: 100 % | TEMPERATURE: 98 F

## 2025-06-26 DIAGNOSIS — M51.16 INTERVERTEBRAL DISC DISORDERS WITH RADICULOPATHY, LUMBAR REGION: ICD-10-CM

## 2025-06-26 DIAGNOSIS — Z98.1 ARTHRODESIS STATUS: Chronic | ICD-10-CM

## 2025-06-26 DIAGNOSIS — Z98.890 OTHER SPECIFIED POSTPROCEDURAL STATES: Chronic | ICD-10-CM

## 2025-06-26 DIAGNOSIS — M54.16 RADICULOPATHY, LUMBAR REGION: ICD-10-CM

## 2025-06-26 DIAGNOSIS — M48.07 SPINAL STENOSIS, LUMBOSACRAL REGION: ICD-10-CM

## 2025-06-26 DIAGNOSIS — M51.17 INTERVERTEBRAL DISC DISORDERS WITH RADICULOPATHY, LUMBOSACRAL REGION: ICD-10-CM

## 2025-06-26 DIAGNOSIS — Z98.82 BREAST IMPLANT STATUS: Chronic | ICD-10-CM

## 2025-06-26 PROCEDURE — C9399: CPT

## 2025-06-26 PROCEDURE — 72100 X-RAY EXAM L-S SPINE 2/3 VWS: CPT

## 2025-06-26 PROCEDURE — 63047 LAM FACETEC & FORAMOT LUMBAR: CPT

## 2025-06-26 PROCEDURE — 63047 LAM FACETEC & FORAMOT LUMBAR: CPT | Mod: AS

## 2025-06-26 RX ORDER — ACETAMINOPHEN 500 MG/5ML
1000 LIQUID (ML) ORAL ONCE
Refills: 0 | Status: COMPLETED | OUTPATIENT
Start: 2025-06-26 | End: 2025-06-26

## 2025-06-26 RX ORDER — APREPITANT 40 MG/1
40 CAPSULE ORAL ONCE
Refills: 0 | Status: COMPLETED | OUTPATIENT
Start: 2025-06-26 | End: 2025-06-26

## 2025-06-26 RX ORDER — ACETAMINOPHEN 500 MG/5ML
2 LIQUID (ML) ORAL
Qty: 42 | Refills: 0
Start: 2025-06-26 | End: 2025-07-02

## 2025-06-26 RX ORDER — DOCUSATE SODIUM 100 MG
1 CAPSULE ORAL
Qty: 36 | Refills: 0
Start: 2025-06-26 | End: 2025-07-07

## 2025-06-26 RX ORDER — ONDANSETRON HCL/PF 4 MG/2 ML
4 VIAL (ML) INJECTION ONCE
Refills: 0 | Status: COMPLETED | OUTPATIENT
Start: 2025-06-26 | End: 2025-06-26

## 2025-06-26 RX ORDER — OXYCODONE HYDROCHLORIDE 30 MG/1
1 TABLET ORAL
Qty: 36 | Refills: 0
Start: 2025-06-26 | End: 2025-07-07

## 2025-06-26 RX ORDER — METHOCARBAMOL 500 MG/1
1 TABLET, FILM COATED ORAL
Qty: 36 | Refills: 0
Start: 2025-06-26 | End: 2025-07-07

## 2025-06-26 RX ORDER — HYDROMORPHONE/SOD CHLOR,ISO/PF 2 MG/10 ML
0.5 SYRINGE (ML) INJECTION
Refills: 0 | Status: DISCONTINUED | OUTPATIENT
Start: 2025-06-26 | End: 2025-06-26

## 2025-06-26 RX ORDER — OXYCODONE HYDROCHLORIDE 30 MG/1
5 TABLET ORAL ONCE
Refills: 0 | Status: DISCONTINUED | OUTPATIENT
Start: 2025-06-26 | End: 2025-06-26

## 2025-06-26 RX ORDER — GABAPENTIN 400 MG/1
300 CAPSULE ORAL ONCE
Refills: 0 | Status: COMPLETED | OUTPATIENT
Start: 2025-06-26 | End: 2025-06-26

## 2025-06-26 RX ORDER — CELECOXIB 50 MG/1
200 CAPSULE ORAL ONCE
Refills: 0 | Status: COMPLETED | OUTPATIENT
Start: 2025-06-26 | End: 2025-06-26

## 2025-06-26 RX ORDER — ACETAMINOPHEN 500 MG/5ML
975 LIQUID (ML) ORAL ONCE
Refills: 0 | Status: DISCONTINUED | OUTPATIENT
Start: 2025-06-26 | End: 2025-06-26

## 2025-06-26 RX ADMIN — GABAPENTIN 300 MILLIGRAM(S): 400 CAPSULE ORAL at 06:38

## 2025-06-26 RX ADMIN — APREPITANT 40 MILLIGRAM(S): 40 CAPSULE ORAL at 06:38

## 2025-06-26 RX ADMIN — Medication 4 MILLIGRAM(S): at 10:16

## 2025-06-26 RX ADMIN — Medication 1000 MILLIGRAM(S): at 06:38

## 2025-06-26 RX ADMIN — CELECOXIB 200 MILLIGRAM(S): 50 CAPSULE ORAL at 06:38

## 2025-06-26 NOTE — ASU DISCHARGE PLAN (ADULT/PEDIATRIC) - CARE PROVIDER_API CALL
Claire Salazar  Neurological Surgery  [address] [place] [address], [address]  [place] [address]  Phone: [phone]  Fax: [phone]  Follow Up Time:

## 2025-06-26 NOTE — ASU DISCHARGE PLAN (ADULT/PEDIATRIC) - FINANCIAL ASSISTANCE
[facility] provides services at a reduced cost to those who are determined to be eligible through [facility]’s financial assistance program. Information regarding [facility]’s financial assistance program can be found by going to [email or web address] or by calling [phone].

## 2025-06-26 NOTE — BRIEF OPERATIVE NOTE - ESTIMATED BLOOD LOSS
Comments:     Last Office Visit (last PCP visit):   11/22/2024    Next Visit Date:  Future Appointments   Date Time Provider Department Center   5/22/2025  1:00 PM Xiomara Pappsa MD [facility] E[facility] DEP       **If hasn't been seen in over a year OR hasn't followed up according to last diabetes/ADHD visit, make appointment for patient before sending refill to provider.    Rx requested:  Requested Prescriptions     Pending Prescriptions Disp Refills    pravastatin (PRAVACHOL) 20 MG tablet 90 tablet 3     Sig: Take 1 tablet by mouth daily    levothyroxine (SYNTHROID) 50 MCG tablet 90 tablet 3     Sig: Take 1 tablet by mouth daily                
10

## 2025-06-26 NOTE — ASU DISCHARGE PLAN (ADULT/PEDIATRIC) - NS MD DC FALL RISK RISK
For information on Fall & Injury Prevention, visit: [email or web address] OR  [email or web address] OR  https://www.cdc.gov/steadi/patient.html

## 2025-06-26 NOTE — ASU DISCHARGE PLAN (ADULT/PEDIATRIC) - FOLLOW UP APPOINTMENTS
[facility],  Endoscopy/Ambulatory Surgery [place] pacu [phone]/[facility],  Endoscopy/Ambulatory Surgery [place]

## 2025-06-26 NOTE — CHART NOTE - NSCHARTNOTEFT_GEN_A_CORE
PACU ANESTHESIA ADMISSION NOTE      Procedure: Lumbar laminoforaminotomy      Post op diagnosis:      ____  Intubated  TV:______       Rate: ______      FiO2: ______    __x__  Patent Airway    __x__  Full return of protective reflexes    _x___  Full recovery from anesthesia / back to baseline     Vitals:   T:  97.2         R:   14               BP:    113/62              Sat:    100%               P: 82      Mental Status:  ____ Awake   __x___ Alert   ___x__ Drowsy   _____ Sedated    Nausea/Vomiting:  _x___ NO  ______Yes,   See Post - Op Orders          Pain Scale (0-10):  _0____    Treatment: ____ None    __x__ See Post - Op/PCA Orders    Post - Operative Fluids:   ____ Oral   _x___ See Post - Op Orders    Plan: Discharge:   _x___Home       _____Floor     _____Critical Care    _____  Other:_________________    Comments: patient hemodynamically stable. Handoff endorsed to PACU RN. No anesthesia related issues present. To be discharged home when criteria met

## 2025-06-26 NOTE — ASU DISCHARGE PLAN (ADULT/PEDIATRIC) - ASU DC SPECIAL INSTRUCTIONSFT
- Upon discharge,  please call to schedule a follow up with Dr. Salazar in 1-2 weeks.  - Upon discharge, please call to make a follow up appointment with your primary care provider to discuss your recent hospitalization/operation.  - Keep dressings dry for 48 hours after which you may remove dressing and cleanse with soap and water in the shower (no scrubbing). Run water and soap over incision sites and pat dry (no scrubbing). No submerging your incision sites in water (i.e. no swimming or baths) for 2-3 weeks and avoid exposing the area to jets/streams of water.   - You can resume your normal activities as tolerated, but avoid heavy (>15lb.) lifting and strenuous exercise for 4-6 weeks.    -You were prescribed Tylenol for moderate post surgical pain, take only as needed.  Do not exceed 4000mg of acetaminophen per day.  - You were prescribed oxycodone for severe post operative pain, take these only as needed.   - Narcotic pain medicine tends to cause constipation, you have been prescribed colace 3 times a day to help prevent that. Hold the colace if you start to have loose stools.  - If you experience fevers, chills, increasing abdominal pain, nausea, vomiting, inability to pass stool or gas, bleeding, or any other acute symptoms, please call your doctor and report to the emergency room immediately for further management.

## 2025-07-03 DIAGNOSIS — Z79.890 HORMONE REPLACEMENT THERAPY: ICD-10-CM

## 2025-07-03 DIAGNOSIS — Z79.85 LONG-TERM (CURRENT) USE OF INJECTABLE NON-INSULIN ANTIDIABETIC DRUGS: ICD-10-CM

## 2025-07-03 DIAGNOSIS — E78.5 HYPERLIPIDEMIA, UNSPECIFIED: ICD-10-CM

## 2025-07-03 DIAGNOSIS — M54.16 RADICULOPATHY, LUMBAR REGION: ICD-10-CM

## 2025-07-03 DIAGNOSIS — E03.9 HYPOTHYROIDISM, UNSPECIFIED: ICD-10-CM

## 2025-07-03 DIAGNOSIS — F41.9 ANXIETY DISORDER, UNSPECIFIED: ICD-10-CM

## 2025-07-09 ENCOUNTER — NON-APPOINTMENT (OUTPATIENT)
Age: 55
End: 2025-07-09

## 2025-07-09 ENCOUNTER — APPOINTMENT (OUTPATIENT)
Dept: NEUROSURGERY | Facility: CLINIC | Age: 55
End: 2025-07-09
Payer: COMMERCIAL

## 2025-07-09 VITALS — WEIGHT: 142 LBS | HEIGHT: 64 IN | BODY MASS INDEX: 24.24 KG/M2

## 2025-07-09 PROBLEM — M54.2 CERVICALGIA: Chronic | Status: ACTIVE | Noted: 2025-05-30

## 2025-07-09 PROBLEM — E78.5 HYPERLIPIDEMIA, UNSPECIFIED: Chronic | Status: ACTIVE | Noted: 2025-05-30

## 2025-07-09 PROBLEM — E03.9 HYPOTHYROIDISM, UNSPECIFIED: Chronic | Status: ACTIVE | Noted: 2025-05-30

## 2025-07-09 PROBLEM — M54.16 RADICULOPATHY, LUMBAR REGION: Chronic | Status: ACTIVE | Noted: 2025-05-30

## 2025-07-09 PROCEDURE — 99024 POSTOP FOLLOW-UP VISIT: CPT

## 2025-07-31 ENCOUNTER — NON-APPOINTMENT (OUTPATIENT)
Age: 55
End: 2025-07-31

## 2025-08-09 ENCOUNTER — OUTPATIENT (OUTPATIENT)
Dept: OUTPATIENT SERVICES | Facility: HOSPITAL | Age: 55
LOS: 1 days | End: 2025-08-09
Payer: COMMERCIAL

## 2025-08-09 DIAGNOSIS — Z12.31 ENCOUNTER FOR SCREENING MAMMOGRAM FOR MALIGNANT NEOPLASM OF BREAST: ICD-10-CM

## 2025-08-09 DIAGNOSIS — R92.2 INCONCLUSIVE MAMMOGRAM: ICD-10-CM

## 2025-08-09 DIAGNOSIS — Z98.82 BREAST IMPLANT STATUS: Chronic | ICD-10-CM

## 2025-08-09 DIAGNOSIS — Z98.890 OTHER SPECIFIED POSTPROCEDURAL STATES: Chronic | ICD-10-CM

## 2025-08-09 DIAGNOSIS — Z98.1 ARTHRODESIS STATUS: Chronic | ICD-10-CM

## 2025-08-09 PROCEDURE — 77063 BREAST TOMOSYNTHESIS BI: CPT | Mod: 26

## 2025-08-09 PROCEDURE — 76641 ULTRASOUND BREAST COMPLETE: CPT | Mod: 26,50

## 2025-08-09 PROCEDURE — 77063 BREAST TOMOSYNTHESIS BI: CPT

## 2025-08-09 PROCEDURE — 77067 SCR MAMMO BI INCL CAD: CPT

## 2025-08-09 PROCEDURE — 76641 ULTRASOUND BREAST COMPLETE: CPT | Mod: 50

## 2025-08-09 PROCEDURE — 77067 SCR MAMMO BI INCL CAD: CPT | Mod: 26

## 2025-08-10 DIAGNOSIS — Z12.31 ENCOUNTER FOR SCREENING MAMMOGRAM FOR MALIGNANT NEOPLASM OF BREAST: ICD-10-CM

## 2025-08-10 DIAGNOSIS — R92.2 INCONCLUSIVE MAMMOGRAM: ICD-10-CM

## 2025-08-25 ENCOUNTER — NON-APPOINTMENT (OUTPATIENT)
Age: 55
End: 2025-08-25

## 2025-08-25 ENCOUNTER — APPOINTMENT (OUTPATIENT)
Dept: NEUROSURGERY | Facility: CLINIC | Age: 55
End: 2025-08-25
Payer: COMMERCIAL

## 2025-08-25 VITALS — HEIGHT: 64 IN | BODY MASS INDEX: 24.24 KG/M2 | WEIGHT: 142 LBS

## 2025-08-25 DIAGNOSIS — M54.16 RADICULOPATHY, LUMBAR REGION: ICD-10-CM

## 2025-08-25 PROCEDURE — 99024 POSTOP FOLLOW-UP VISIT: CPT
